# Patient Record
Sex: FEMALE | Race: WHITE | NOT HISPANIC OR LATINO | URBAN - METROPOLITAN AREA
[De-identification: names, ages, dates, MRNs, and addresses within clinical notes are randomized per-mention and may not be internally consistent; named-entity substitution may affect disease eponyms.]

---

## 2017-03-28 ENCOUNTER — OFFICE VISIT (OUTPATIENT)
Dept: FAMILY MEDICINE | Facility: CLINIC | Age: 37
End: 2017-03-28

## 2017-03-28 VITALS
DIASTOLIC BLOOD PRESSURE: 73 MMHG | HEIGHT: 65 IN | SYSTOLIC BLOOD PRESSURE: 112 MMHG | TEMPERATURE: 98.2 F | OXYGEN SATURATION: 100 % | BODY MASS INDEX: 21.17 KG/M2 | WEIGHT: 127.08 LBS | HEART RATE: 61 BPM

## 2017-03-28 DIAGNOSIS — Z00.00 PREVENTATIVE HEALTH CARE: Primary | ICD-10-CM

## 2017-03-28 DIAGNOSIS — M21.611 BUNION, RIGHT: ICD-10-CM

## 2017-03-28 DIAGNOSIS — Z13.220 SCREENING FOR HYPERLIPIDEMIA: ICD-10-CM

## 2017-03-28 LAB
BUN SERPL-MCNC: 14 MG/DL (ref 5–24)
CALCIUM SERPL-MCNC: 8.9 MG/DL (ref 8.5–10.4)
CHLORIDE SERPLBLD-SCNC: 103 MMOL/L (ref 94–109)
CHOLEST SERPL-MCNC: 236 MG/DL (ref 0–200)
CHOLEST/HDLC SERPL: 3.5 {RATIO} (ref 0–5)
CO2 SERPL-SCNC: 28 MMOL/L (ref 20–32)
CREAT SERPL-MCNC: 0.7 MG/DL (ref 0.6–1.3)
DEPRECATED CALCIDIOL+CALCIFEROL SERPL-MC: 26 UG/L (ref 20–75)
EGFR CALCULATED (BLACK REFERENCE): 120.4
EGFR CALCULATED (NON BLACK REFERENCE): 99.5
ERYTHROCYTE [DISTWIDTH] IN BLOOD BY AUTOMATED COUNT: 12.6 % (ref 10–15)
FASTING SPECIMEN: YES
GLUCOSE SERPL-MCNC: 89 MG/DL (ref 60–109)
HCT VFR BLD AUTO: 40.4 % (ref 35–47)
HDLC SERPL-MCNC: 66 MG/DL
HGB BLD-MCNC: 13.4 G/DL (ref 11.7–15.7)
LDLC SERPL CALC-MCNC: 155 MG/DL (ref 0–129)
MCH RBC QN AUTO: 30.5 PG (ref 26.5–33)
MCHC RBC AUTO-ENTMCNC: 33.2 G/DL (ref 31.5–36.5)
MCV RBC AUTO: 92 FL (ref 78–100)
PLATELET # BLD AUTO: 323 10E9/L (ref 150–450)
POTASSIUM SERPL-SCNC: 4.2 MMOL/L (ref 3.4–5.3)
RBC # BLD AUTO: 4.4 10E12/L (ref 3.8–5.2)
SODIUM SERPL-SCNC: 139 MMOL/L (ref 133–144)
TRIGL SERPL-MCNC: 70 MG/DL (ref 0–150)
VLDL-CHOLESTEROL: 14 (ref 7–32)
WBC # BLD AUTO: 7.8 10E9/L (ref 4–11)

## 2017-03-28 ASSESSMENT — PAIN SCALES - GENERAL: PAINLEVEL: NO PAIN (0)

## 2017-03-28 NOTE — MR AVS SNAPSHOT
After Visit Summary   3/28/2017    Mrs. Ophelia Vincent    MRN: [id number]           Patient Information     Date Of Birth          1980        Visit Information        Provider Department      3/28/2017 8:00 AM Mildred Becerra MD [facility]        Today's Diagnoses     Preventative health care    -  1    Screening for hyperlipidemia          Care Instructions      Preventive Health Recommendations  Female Ages 26 - 39  Yearly exam:   See your health care provider every year in order to    Review health changes.     Discuss preventive care.      Review your medicines if you your doctor has prescribed any.    Until age 30: Get a Pap test every three years (more often if you have had an abnormal result).    After age 30: Talk to your doctor about whether you should have a Pap test every 3 years or have a Pap test with HPV screening every 5 years.   You do not need a Pap test if your uterus was removed (hysterectomy) and you have not had cancer.  You should be tested each year for STDs (sexually transmitted diseases), if you're at risk.   Talk to your provider about how often to have your cholesterol checked.  If you are at risk for diabetes, you should have a diabetes test (fasting glucose).  Shots: Get a flu shot each year. Get a tetanus shot every 10 years.   Nutrition:     Eat at least 5 servings of fruits and vegetables each day.    Eat whole-grain bread, whole-wheat pasta and brown rice instead of white grains and rice.    Talk to your provider about Calcium and Vitamin D.     Lifestyle    Exercise at least 150 minutes a week (30 minutes a day, 5 days of the week). This will help you control your weight and prevent disease.    Limit alcohol to one drink per day.    No smoking.     Wear sunscreen to prevent skin cancer.    See your dentist every six months for an exam and cleaning.      CALCIUM    Recommendations:  Teenagers, men and premenopausal women: 1200  mg/day  Pregnant and Lactating women: 1500 mg/day  Postmenopausal women on estrogen: 1200mg/day  Postmenopausal women not on estrogen: 1500 mg/day    If you are not eating dairy products you also need 1000 IU of vitamin D per day which can be obtained in either a multivitamin or in some of the Calcium tablets.   look at % (based on 1000)    Dairy sources  Milk-  cow's              8 oz            300 mg  [place] milk             8 oz            450mg  Yogurt                      6 oz            300mg  Hard cheese                     1.5 oz          300 mg  Cottage cheese                  2 cup           300 mg  Low fat dairy sources are recommended    Non Dairy sources  OJ with calcium               1 cup           300mg  Total cereal                     1 cup           1000mg    Supplements:  Tums EX                         300 mg  Tums Ultra                      400 mg  Caltrate 600                    600 mg  Oscal                           500 mg  Oscal/D                         500 mg plus vitamin D  Viactive chews                  500mg each  Women's Formula Multivitamin    450 mg          Follow-ups after your visit        Who to contact     Please call your clinic at [phone] to:    Ask questions about your health    Make or cancel appointments    Discuss your medicines    Learn about your test results    Speak to your doctor   If you have compliments or concerns about an experience at your clinic, or if you wish to file a complaint, please contact [facility] Patient Relations at [phone] or email us at [email or web address]         Additional Information About Your Visit        MyChart Information     [employer] gives you secure access to your electronic health record. If you see a primary care provider, you can also send messages to your care team and make appointments. If you have questions, please call your primary care clinic.  If you do not have a primary care  "provider, please call [phone] and they will assist you.      [employer] is an electronic gateway that provides easy, online access to your medical records. With [employer], you can request a clinic appointment, read your test results, renew a prescription or communicate with your care team.     To access your existing account, please contact your [facility] or call [phone] for assistance.        Care EveryWhere ID     This is your Care EveryWhere ID. This could be used by other organizations to access your [place] medical records  [id number]        Your Vitals Were     Pulse Temperature Height Last Period Pulse Oximetry Breastfeeding?    61 98.2  F (36.8  C) 5' 5\" (165.1 cm) 03/11/2017 (Exact Date) 100% No    BMI (Body Mass Index)                   21.15 kg/m2            Blood Pressure from Last 3 Encounters:   03/28/17 112/73   03/16/16 115/78   03/19/15 112/73    Weight from Last 3 Encounters:   03/28/17 127 lb 1.3 oz (57.6 kg)   03/16/16 124 lb 12 oz (56.6 kg)   03/19/15 121 lb (54.9 kg)              We Performed the Following     Basic Metabolic Panel ([place])     CBC with platelets     Lipid Panel ([place])     Vitamin D Deficiency        Primary Care Provider Office Phone # Fax #    Mildred Becerra [phone]       [place] [phone]        Thank you!     Thank you for choosing [facility]  for your care. Our goal is always to provide you with excellent care. Hearing back from our patients is one way we can continue to improve our services. Please take a few minutes to complete the written survey that you may receive in the mail after your visit with us. Thank you!             Your Updated Medication List - Protect others around you: Learn how to safely use, store and throw away your medicines at www.disposemymeds.org.          This list is accurate as of: 3/28/17  8:51 AM.  Always use your most " recent med list.                   Brand Name Dispense Instructions for use    COMPRESSION STOCKINGS     2 Package    Knee high compression stockings  20-30mmHg such as TEDS hose       MULTIVITAMINS PO

## 2017-03-28 NOTE — PATIENT INSTRUCTIONS
Preventive Health Recommendations  Female Ages 26 - 39  Yearly exam:   See your health care provider every year in order to    Review health changes.     Discuss preventive care.      Review your medicines if you your doctor has prescribed any.    Until age 30: Get a Pap test every three years (more often if you have had an abnormal result).    After age 30: Talk to your doctor about whether you should have a Pap test every 3 years or have a Pap test with HPV screening every 5 years.   You do not need a Pap test if your uterus was removed (hysterectomy) and you have not had cancer.  You should be tested each year for STDs (sexually transmitted diseases), if you're at risk.   Talk to your provider about how often to have your cholesterol checked.  If you are at risk for diabetes, you should have a diabetes test (fasting glucose).  Shots: Get a flu shot each year. Get a tetanus shot every 10 years.   Nutrition:     Eat at least 5 servings of fruits and vegetables each day.    Eat whole-grain bread, whole-wheat pasta and brown rice instead of white grains and rice.    Talk to your provider about Calcium and Vitamin D.     Lifestyle    Exercise at least 150 minutes a week (30 minutes a day, 5 days of the week). This will help you control your weight and prevent disease.    Limit alcohol to one drink per day.    No smoking.     Wear sunscreen to prevent skin cancer.    See your dentist every six months for an exam and cleaning.      CALCIUM    Recommendations:  Teenagers, men and premenopausal women: 1200 mg/day  Pregnant and Lactating women: 1500 mg/day  Postmenopausal women on estrogen: 1200mg/day  Postmenopausal women not on estrogen: 1500 mg/day    If you are not eating dairy products you also need 1000 IU of vitamin D per day which can be obtained in either a multivitamin or in some of the Calcium tablets.   look at % (based on 1000)    Dairy sources  Milk-  cow's              8 oz            300 mg  [place] milk              8 oz            450mg  Yogurt                      6 oz            300mg  Hard cheese                     1.5 oz          300 mg  Cottage cheese                  2 cup           300 mg  Low fat dairy sources are recommended    Non Dairy sources  OJ with calcium               1 cup           300mg  Total cereal                     1 cup           1000mg    Supplements:  Tums EX                         300 mg  Tums Ultra                      400 mg  Caltrate 600                    600 mg  Oscal                           500 mg  Oscal/D                         500 mg plus vitamin D  Viactive chews                  500mg each  Women's Formula Multivitamin    450 mg

## 2017-03-28 NOTE — PROGRESS NOTES
Ophelia Vincent is here for a general check up. She is fasting. She is up to date on eye exams and dental visits. Wears seat belt-yes. Bike helmet- na.   Concerns today:    KIMBERLY Chin is here for check up. She is due for routine pap smear, but sees ob/gyn. Up to date on Tdap.  Influenza vaccine: declines.     Diet: healthy diet, lots of veggies, cooks at home, some meat  Snacking throughout the day    Migraine  She gets a monthly migraine, usually with her menses. She uses Excederin, gets aura. Pain starts behind right eye, throbbing. Associated with menses.  She declines rx for sumatriptan but we discussed use of ibuprofen prior to menses to help dampen symptoms.     Bunion  Right great toe, worse with walking. Her great toe is also starting to overlap with the second toe. We discussed referral to podiatry.    Health Maintenance   Topic Date Due     PAP SCREENING Q3 YR (SYSTEM ASSIGNED)  03/01/2017     INFLUENZA VACCINE (SYSTEM ASSIGNED)  09/01/2017     TETANUS IMMUNIZATION (SYSTEM ASSIGNED)  01/03/2024       Patient Active Problem List   Diagnosis     Migraine with aura     Unicornate uterus       Past Surgical History:   Procedure Laterality Date     NO HISTORY OF SURGERY         Family History   Problem Relation Age of Onset     Hypertension Mother      Hyperlipidemia Mother      HEART DISEASE Father 51     MI with stent placement     High cholesterol Father      OSTEOPOROSIS Maternal Grandmother      CEREBROVASCULAR DISEASE Paternal Grandmother      Hypertension Paternal Grandmother      CANCER Maternal Grandfather      bone marrow, leukemia     DIABETES Maternal Grandfather      Hypertension Maternal Grandfather      Alcoholism Maternal Grandfather      HEART DISEASE Paternal Grandfather      HEART DISEASE Paternal Uncle 50     x 2 brother     Migraines Sister        Social    1 son, 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  Hx abnormal pap? None  Sees ob/gyn  STD hx? no  cycle length: 5 days of flow  Typically 28 days  dysmenorrhea/PMS: yes, 1 wk prior to menses, migraine, Excederin  Vasomotor sx: no  Contraception: none, open to pregnancy   G 1 P 1 A 0  Self Breast exam: yes    Current Outpatient Prescriptions   Medication Sig Dispense Refill     Multiple Vitamin (MULTIVITAMINS PO)        COMPRESSION STOCKINGS Knee high compression stockings   20-30mmHg such as TEDS hose 2 Package 1     Prenatal MV-Min-Fe Fum-FA-DHA (PRENATAL 1 PO)        Allergies   Allergen Reactions     Penicillins          ROS  CONSTITUTIONAL:NEGATIVE for fever, chills, change in weight  INTEGUMENTARY/SKIN: NEGATIVE for worrisome rashes, . She has noted more moles and is asking for routine skin check.   EYES: NEGATIVE for vision changes or irritation  ENT/MOUTH: NEGATIVE for ear, mouth and throat problems  RESP:NEGATIVE for significant cough or SOB  BREAST: NEGATIVE for masses, tenderness or discharge  CV: NEGATIVE for chest pain, palpitations, RICKETTS, orthopnea, PND  or peripheral edema  GI: NEGATIVE for nausea, abdominal pain, heartburn, or change in bowel habits  :NEGATIVE for frequency, dysuria, or hematuria  MUSCULOSKELETAL:NEGATIVE for significant arthralgias or myalgia  NEURO: NEGATIVE for weakness, dizziness or paresthesias. Positive for migraines as above  ENDOCRINE: NEGATIVE for polyuria/dipsia,  temperature intolerance, skin/hair changes  HEME/ALLERGY/IMMUNE: NEGATIVE for bleeding problems  PSYCHIATRIC: NEGATIVE for changes in mood or affect, situational stress with trying to balance family/ job but no depression or anxiety. Discussed self cares. Sleep is interrupted with toddler in the house    EXAM  /73 (BP Location: Left arm, Patient Position: Chair, Cuff Size: Adult Regular)  Pulse 61  Temp 98.2  F  "(36.8  C)  Ht 5' 5\" (165.1 cm)  Wt 127 lb 1.3 oz (57.6 kg)  LMP 03/11/2017 (Exact Date)  SpO2 100%  Breastfeeding? No  BMI 21.15 kg/m2  GENERAL APPEARANCE: Alert, pleasant, NAD  EYES: PERRL, EOMI, conjunctiva clear  HENT: TM normal bilaterally. Nose and mouth without lesions  NECK: no adenopathy, thyroid normal to palpation  RESP: lungs clear to auscultation bilaterally,  BREAST: normal without masses, no tenderness or nipple discharge and no palpable  axillary masses or adenopathy   CV: regular rate and rhythm, normal S1 S2, no murmur, no carotid bruits  ABDOMEN: soft, nontender, without HSM or masses. Bowel sounds normal  : Not done  RECTAL EXAM: not done   MS: extremities normal- no gross deformities noted, no tender, hot or swollen joints.    SKIN: no suspicious lesions or rashes  NEURO: Normal strength and tone, sensory exam grossly normal, DTR normoreflexive in upper and lower extremities  PSYCH: mentation appears normal. and affect normal/bright.  EXT: no peripheral edema, pedal pulses palpable  Right foot: noted bunion at first metatarsal head. Slight overlap of great toe onto second toe.    Assessment:  (Z00.00) Preventative health care  (primary encounter diagnosis)  Comment: healthy 37 yo woman  Plan: Basic Metabolic Panel ([place]), CBC with         platelets, Vitamin D Deficiency        Anticipatory guidance given today regarding diet, exercise, calcium intake.      (Z13.220) Screening for hyperlipidemia  Comment: fasting today, family hx of early heart disease.   Plan: Lipid Panel ([place])        Discussed healthy diet, need for regular exercise program    (M21.611) Bunion, right  Comment: worsening pain, now great toe also starting to overlap second toe  Plan: PODIATRY/FOOT & ANKLE SURGERY REFERRAL        Refer to podiatrist for evaluation and treatment.     Mildred Becerra MD  Internal Medicine/Pediatrics      "

## 2017-03-28 NOTE — NURSING NOTE
"36 year old  Chief Complaint   Patient presents with     Physical       Blood pressure 112/73, pulse 61, temperature 98.2  F (36.8  C), height 5' 5\" (165.1 cm), weight 127 lb 1.3 oz (57.6 kg), SpO2 100 %. Body mass index is 21.15 kg/(m^2).  Patient Active Problem List   Diagnosis     Migraine with aura     Unicornate uterus       Wt Readings from Last 2 Encounters:   03/28/17 127 lb 1.3 oz (57.6 kg)   03/16/16 124 lb 12 oz (56.6 kg)     BP Readings from Last 3 Encounters:   03/28/17 112/73   03/16/16 115/78   03/19/15 112/73         Current Outpatient Prescriptions   Medication     Multiple Vitamin (MULTIVITAMINS PO)     COMPRESSION STOCKINGS     No current facility-administered medications for this visit.        Social History   Substance Use Topics     Smoking status: Never Smoker     Smokeless tobacco: Never Used     Alcohol use No       Health Maintenance Due   Topic Date Due     PAP SCREENING Q3 YR (SYSTEM ASSIGNED)  03/01/2017       No results found for: DENA Celestin CMA  March 28, 2017 7:56 AM  "

## 2017-12-24 ENCOUNTER — HEALTH MAINTENANCE LETTER (OUTPATIENT)
Age: 37
End: 2017-12-24

## 2018-01-04 ENCOUNTER — OFFICE VISIT (OUTPATIENT)
Dept: DERMATOLOGY | Facility: CLINIC | Age: 38
End: 2018-01-04
Payer: COMMERCIAL

## 2018-01-04 DIAGNOSIS — D23.9 DERMAL NEVUS: Primary | ICD-10-CM

## 2018-01-04 DIAGNOSIS — D22.9 MULTIPLE BENIGN NEVI: ICD-10-CM

## 2018-01-04 ASSESSMENT — PAIN SCALES - GENERAL: PAINLEVEL: NO PAIN (0)

## 2018-01-04 NOTE — MR AVS SNAPSHOT
After Visit Summary   1/4/2018    Ophelia Vincent    MRN: [id number]           Patient Information     Date Of Birth          1980        Visit Information        Provider Department      1/4/2018 8:30 AM Panchito Lau MD [facility]        Today's Diagnoses     Dermal nevus    -  1    Multiple benign nevi          Care Instructions    The ABCDEs of Melanoma    Skin cancer can develop anywhere on the skin. Ask someone for help when checking your skin, especially in hard to see places. If you notice a mole different from others, or that changes, enlarges, itches, or bleeds (even if it is small), you should see a dermatologist.                  Follow-ups after your visit        Who to contact     Please call your clinic at [phone] to:    Ask questions about your health    Make or cancel appointments    Discuss your medicines    Learn about your test results    Speak to your doctor   If you have compliments or concerns about an experience at your clinic, or if you wish to file a complaint, please contact [facility] Patient Relations at [phone] or email us at [email or web address]         Additional Information About Your Visit        MyChart Information     [employer] gives you secure access to your electronic health record. If you see a primary care provider, you can also send messages to your care team and make appointments. If you have questions, please call your primary care clinic.  If you do not have a primary care provider, please call [phone] and they will assist you.      [email or web address] is an electronic gateway that provides easy, online access to your medical records. With [email or web address], you can request a clinic appointment, read your test results, renew a prescription or communicate with your care team.     To access your existing account, please contact your [facility] or call [phone] for  assistance.        Care EveryWhere ID     This is your Care EveryWhere ID. This could be used by other organizations to access your [place] medical records  [id number]         Blood Pressure from Last 3 Encounters:   03/28/17 112/73   03/16/16 115/78   03/19/15 112/73    Weight from Last 3 Encounters:   03/28/17 57.6 kg (127 lb 1.3 oz)   03/16/16 56.6 kg (124 lb 12 oz)   03/19/15 54.9 kg (121 lb)              Today, you had the following     No orders found for display       Primary Care Provider Office Phone # Fax #    Mildred Munira Becerra [phone]       [address] [place] [id number]        Equal Access to Services     MARY NARANJO : Bryson lloydo Sonicki, waaxda luqadaha, qaybta kaalmada adeegyada, sandra hall . So [facility] [phone].    ATENCIÓN: Si habla español, tiene a alfonso disposición servicios gratuitos de asistencia lingüística. Ll[facility] [phone].    We comply with applicable federal civil rights laws and [place] laws. We do not discriminate on the basis of race, color, national origin, age, disability, sex, sexual orientation, or gender identity.            Thank you!     Thank you for choosing [facility]  for your care. Our goal is always to provide you with excellent care. Hearing back from our patients is one way we can continue to improve our services. Please take a few minutes to complete the written survey that you may receive in the mail after your visit with us. Thank you!             Your Updated Medication List - Protect others around you: Learn how to safely use, store and throw away your medicines at www.disposemymeds.org.          This list is accurate as of: 1/4/18  8:57 AM.  Always use your most recent med list.                   Brand Name Dispense Instructions for use Diagnosis    COMPRESSION STOCKINGS     2 Package    Knee high compression stockings  20-30mmHg such as TEDS hose    Varicose veins        MULTIVITAMINS PO

## 2018-01-04 NOTE — NURSING NOTE
Chief Complaint   Patient presents with     Skin Check     Elsy is here today to have a skin cancer exam. She has one mole that is newer after having a baby, under her right underarm. She states that the mole does not bother her but she would like to make sure its okay.      Luda George, CMA

## 2018-01-04 NOTE — LETTER
1/4/2018       RE: Ophelia Vincent  [address]  [facility] [address]     Dear Colleague,    Thank you for referring your patient, Ophelia Vincent, to the [facility] DERMATOLOGY at [facility]. Please see a copy of my visit note below.    [facility] Dermatology Note      Dermatology Problem List:  FBSE 1/4/18  1. Multiple benign nevi  2. SKs.     Encounter Date: Jan 4, 2018    CC:   Chief Complaint   Patient presents with     Skin Check     Elsy is here today to have a skin cancer exam. She has one mole that is newer after having a baby, under her right underarm. She states that the mole does not bother her but she would like to make sure its okay.        History of Present Illness:  Ms. Ophelia Vincent is a 37 year old female who presents in referral for a full body skin examination. She reports a growing brown raised lesion in her right axilla. Has noticed for about 9 months, but asymptomatic, no bleeding, pain, or pruritus. She reports no personal history of skin cancer but does have a family history of possible NMSC in her maternal grandfather. She otherwise denies any new or concerning lesions. No bleeding, pruritic or painful lesions. She reports no history of indoor tanning. She wears a SPF 15 sunscreen/moisturizer daily. No other skin concerns.     Past Medical History:   Patient Active Problem List   Diagnosis     Migraine with aura     Unicornate uterus     Past Medical History:   Diagnosis Date     Unicornate uterus     ovulates every other month     Past Surgical History:   Procedure Laterality Date     NO HISTORY OF SURGERY         Social History:  The patient works as a psychologist. The patient denies use of tanning beds.    Family History:  Possible (?) family history of NMSC in her maternal grandfather.     Medications:  Current Outpatient Prescriptions   Medication Sig Dispense Refill     Multiple Vitamin  (MULTIVITAMINS PO)        COMPRESSION STOCKINGS Knee high compression stockings   20-30mmHg such as TEDS hose 2 Package 1        Allergies   Allergen Reactions     Penicillins          Review of Systems:  -Skin/Heme New Pt: The patient denies frequent sun exposure. The patient denies excessive scarring or problems healing except as per HPI. The patient denies excessive bleeding.  -Constitutional: The patient denies fatigue, fevers, chills, unintended weight loss, and night sweats.  -HEENT: Patient denies nonhealing oral sores.  -Skin: As above in HPI. No additional skin concerns.    Physical exam:  Vitals: There were no vitals taken for this visit.  GEN: This is a well developed, well-nourished female in no acute distress, in a pleasant mood.    SKIN: Total skin excluding the undergarment areas was performed. The exam included the head/face, neck, both arms, chest, back, abdomen, both legs, digits and/or nails.  - Kwan Type I/II   -There is a waxy stuck on tan to brown papule with milia-like cysts in the right axillae.  -Multiple regular brown pigmented macules and papules are identified on the trunk and extremities. Normal reticular and globular pigment network under dermoscopy. ~ 20-30 total nevi.  - Mild generalized xerosis with few excoriations on the upper back.   - No other lesions of concern on areas examined.     Impression/Plan:    1. Seborrheic keratosis, trunk, extremities, axilla. Benign nature discussed. Given largely asymptomatic, no treatment needed.     2. Multiple clinically benign nevi on the trunk and extremities.    ABCDs of melanoma were discussed and self skin checks were advised.     Sun precaution was advised including the use of sun screens of SPF 30 or higher, sun protective clothing, and avoidance of tanning beds.    Follow-up prn for new or changing lesions.  Dr. Waldrop staffed the patient.    Staff Involved:  Resident(Panchito Lau)/Staff(as above)    I have personally examined  this patient and agree with Dr. Lau's documentation and plan of care. I have reviewed and amended the resident's note above. The documentation accurately reflects my clinical observations, diagnoses, treatment and follow-up plans.     Luiza Waldrop MD  Dermatology Staff

## 2018-01-04 NOTE — PROGRESS NOTES
[facility] Dermatology Note      Dermatology Problem List:  FBSE 1/4/18  1. Multiple benign nevi  2. SKs.     Encounter Date: Jan 4, 2018    CC:   Chief Complaint   Patient presents with     Skin Check     Elsy is here today to have a skin cancer exam. She has one mole that is newer after having a baby, under her right underarm. She states that the mole does not bother her but she would like to make sure its okay.        History of Present Illness:  Ms. Ophelia Vincent is a 37 year old female who presents in referral for a full body skin examination. She reports a growing brown raised lesion in her right axilla. Has noticed for about 9 months, but asymptomatic, no bleeding, pain, or pruritus. She reports no personal history of skin cancer but does have a family history of possible NMSC in her maternal grandfather. She otherwise denies any new or concerning lesions. No bleeding, pruritic or painful lesions. She reports no history of indoor tanning. She wears a SPF 15 sunscreen/moisturizer daily. No other skin concerns.     Past Medical History:   Patient Active Problem List   Diagnosis     Migraine with aura     Unicornate uterus     Past Medical History:   Diagnosis Date     Unicornate uterus     ovulates every other month     Past Surgical History:   Procedure Laterality Date     NO HISTORY OF SURGERY         Social History:  The patient works as a psychologist. The patient denies use of tanning beds.    Family History:  Possible (?) family history of NMSC in her maternal grandfather.     Medications:  Current Outpatient Prescriptions   Medication Sig Dispense Refill     Multiple Vitamin (MULTIVITAMINS PO)        COMPRESSION STOCKINGS Knee high compression stockings   20-30mmHg such as TEDS hose 2 Package 1        Allergies   Allergen Reactions     Penicillins          Review of Systems:  -Skin/Heme New Pt: The patient denies frequent sun exposure. The patient denies excessive  scarring or problems healing except as per HPI. The patient denies excessive bleeding.  -Constitutional: The patient denies fatigue, fevers, chills, unintended weight loss, and night sweats.  -HEENT: Patient denies nonhealing oral sores.  -Skin: As above in HPI. No additional skin concerns.    Physical exam:  Vitals: There were no vitals taken for this visit.  GEN: This is a well developed, well-nourished female in no acute distress, in a pleasant mood.    SKIN: Total skin excluding the undergarment areas was performed. The exam included the head/face, neck, both arms, chest, back, abdomen, both legs, digits and/or nails.  - Kwan Type I/II   -There is a waxy stuck on tan to brown papule with milia-like cysts in the right axillae.  -Multiple regular brown pigmented macules and papules are identified on the trunk and extremities. Normal reticular and globular pigment network under dermoscopy. ~ 20-30 total nevi.  - Mild generalized xerosis with few excoriations on the upper back.   - No other lesions of concern on areas examined.     Impression/Plan:    1. Seborrheic keratosis, trunk, extremities, axilla. Benign nature discussed. Given largely asymptomatic, no treatment needed.     2. Multiple clinically benign nevi on the trunk and extremities.    ABCDs of melanoma were discussed and self skin checks were advised.     Sun precaution was advised including the use of sun screens of SPF 30 or higher, sun protective clothing, and avoidance of tanning beds.    Follow-up prn for new or changing lesions.  Dr. Waldrop staffed the patient.    Staff Involved:  Resident(Panchito Lau)/Staff(as above)    I have personally examined this patient and agree with Dr. Lau's documentation and plan of care. I have reviewed and amended the resident's note above. The documentation accurately reflects my clinical observations, diagnoses, treatment and follow-up plans.     Luiza Waldrop MD  Dermatology Staff

## 2018-05-14 NOTE — PATIENT INSTRUCTIONS
theracane    Preventive Health Recommendations  Female Ages 26 - 39  Yearly exam:   See your health care provider every year in order to    Review health changes.     Discuss preventive care.      Review your medicines if you your doctor has prescribed any.    Until age 30: Get a Pap test every three years (more often if you have had an abnormal result).    After age 30: Talk to your doctor about whether you should have a Pap test every 3 years or have a Pap test with HPV screening every 5 years.   You do not need a Pap test if your uterus was removed (hysterectomy) and you have not had cancer.  You should be tested each year for STDs (sexually transmitted diseases), if you're at risk.   Talk to your provider about how often to have your cholesterol checked.  If you are at risk for diabetes, you should have a diabetes test (fasting glucose).  Shots: Get a flu shot each year. Get a tetanus shot every 10 years.   Nutrition:     Eat at least 5 servings of fruits and vegetables each day.    Eat whole-grain bread, whole-wheat pasta and brown rice instead of white grains and rice.    Talk to your provider about Calcium and Vitamin D.     Lifestyle    Exercise at least 150 minutes a week (30 minutes a day, 5 days of the week). This will help you control your weight and prevent disease.    Limit alcohol to one drink per day.    No smoking.     Wear sunscreen to prevent skin cancer.    See your dentist every six months for an exam and cleaning.

## 2018-05-15 ENCOUNTER — OFFICE VISIT (OUTPATIENT)
Dept: FAMILY MEDICINE | Facility: CLINIC | Age: 38
End: 2018-05-15
Payer: COMMERCIAL

## 2018-05-15 VITALS
DIASTOLIC BLOOD PRESSURE: 70 MMHG | BODY MASS INDEX: 21.49 KG/M2 | HEART RATE: 61 BPM | TEMPERATURE: 97.7 F | SYSTOLIC BLOOD PRESSURE: 104 MMHG | OXYGEN SATURATION: 100 % | WEIGHT: 129 LBS | HEIGHT: 65 IN

## 2018-05-15 DIAGNOSIS — I83.90 VARICOSE VEIN OF LEG: ICD-10-CM

## 2018-05-15 DIAGNOSIS — N92.0 MENORRHAGIA WITH REGULAR CYCLE: ICD-10-CM

## 2018-05-15 DIAGNOSIS — Z00.00 ROUTINE GENERAL MEDICAL EXAMINATION AT A HEALTH CARE FACILITY: Primary | ICD-10-CM

## 2018-05-15 DIAGNOSIS — Z82.49 FAMILY HISTORY OF ISCHEMIC HEART DISEASE: ICD-10-CM

## 2018-05-15 LAB
CHOLEST SERPL-MCNC: 213 MG/DL (ref 0–200)
CHOLEST/HDLC SERPL: 3.6 {RATIO} (ref 0–5)
ERYTHROCYTE [DISTWIDTH] IN BLOOD BY AUTOMATED COUNT: 12.5 % (ref 10–15)
FASTING SPECIMEN: YES
FERRITIN SERPL-MCNC: 19 NG/ML (ref 12–150)
HCT VFR BLD AUTO: 39.1 % (ref 35–47)
HDLC SERPL-MCNC: 59 MG/DL
HGB BLD-MCNC: 13.2 G/DL (ref 11.7–15.7)
IRON SATN MFR SERPL: 50 % (ref 15–46)
IRON SERPL-MCNC: 121 UG/DL (ref 35–180)
LDLC SERPL CALC-MCNC: 140 MG/DL (ref 0–129)
MCH RBC QN AUTO: 30.8 PG (ref 26.5–33)
MCHC RBC AUTO-ENTMCNC: 33.8 G/DL (ref 31.5–36.5)
MCV RBC AUTO: 91 FL (ref 78–100)
PLATELET # BLD AUTO: 272 10E9/L (ref 150–450)
RBC # BLD AUTO: 4.29 10E12/L (ref 3.8–5.2)
TIBC SERPL-MCNC: 240 UG/DL (ref 240–430)
TRIGL SERPL-MCNC: 69 MG/DL (ref 0–150)
TSH SERPL DL<=0.005 MIU/L-ACNC: 0.92 MU/L (ref 0.4–4)
VLDL-CHOLESTEROL: 14 (ref 7–32)
WBC # BLD AUTO: 4.9 10E9/L (ref 4–11)

## 2018-05-15 ASSESSMENT — PAIN SCALES - GENERAL: PAINLEVEL: NO PAIN (0)

## 2018-05-15 NOTE — MR AVS SNAPSHOT
After Visit Summary   5/15/2018    Ophelia Vincent    MRN: [id number]           Patient Information     Date Of Birth          1980        Visit Information        Provider Department      5/15/2018 8:00 AM Mildred Becerra MD [facility]        Today's Diagnoses     Routine general medical examination at a health care facility    -  1    Family history of ischemic heart disease        Menorrhagia with regular cycle        Varicose vein of leg          Care Instructions    theracane    Preventive Health Recommendations  Female Ages 26 - 39  Yearly exam:   See your health care provider every year in order to    Review health changes.     Discuss preventive care.      Review your medicines if you your doctor has prescribed any.    Until age 30: Get a Pap test every three years (more often if you have had an abnormal result).    After age 30: Talk to your doctor about whether you should have a Pap test every 3 years or have a Pap test with HPV screening every 5 years.   You do not need a Pap test if your uterus was removed (hysterectomy) and you have not had cancer.  You should be tested each year for STDs (sexually transmitted diseases), if you're at risk.   Talk to your provider about how often to have your cholesterol checked.  If you are at risk for diabetes, you should have a diabetes test (fasting glucose).  Shots: Get a flu shot each year. Get a tetanus shot every 10 years.   Nutrition:     Eat at least 5 servings of fruits and vegetables each day.    Eat whole-grain bread, whole-wheat pasta and brown rice instead of white grains and rice.    Talk to your provider about Calcium and Vitamin D.     Lifestyle    Exercise at least 150 minutes a week (30 minutes a day, 5 days of the week). This will help you control your weight and prevent disease.    Limit alcohol to one drink per day.    No smoking.     Wear sunscreen to prevent skin cancer.    See your dentist every six  months for an exam and cleaning.            Follow-ups after your visit        Additional Services     Cardiology Adult Referral - [place]       Your provider has referred you to:     Adult Cardiology  [phone]  Option #1 - [facility]  Option #2 - [place]    Please be aware that coverage of these services is subject to the terms and limitations of your health insurance plan.  Call member services at your health plan with any benefit or coverage questions.      Type of Referral:  Heart Prevention Screening ([facility])    Timeframe requested:  Within 1 month    Please bring the following to your appointment:  >>   Any x-rays, CTs or MRIs which have been performed.  Contact the facility where they were done to arrange for  prior to your scheduled appointment.  Any new CT, MRI or other procedures ordered by your specialist must be performed at a [facility] or coordinated by your clinic's referral office.   >>   List of current medications  >>   This referral request   >>   Any documents/labs given to you for this referral                  Who to contact     Please call your clinic at [phone] to:    Ask questions about your health    Make or cancel appointments    Discuss your medicines    Learn about your test results    Speak to your doctor            Additional Information About Your Visit        [employer] Information     [employer] gives you secure access to your electronic health record. If you see a primary care provider, you can also send messages to your care team and make appointments. If you have questions, please call your primary care clinic.  If you do not have a primary care provider, please call [phone] and they will assist you.      [employer] is an electronic gateway that provides easy, online access to your medical records. With [employer], you can request a clinic appointment, read your test results, renew a prescription or communicate with  "your care team.     To access your existing account, please contact your [facility] or call [phone] for assistance.        Care EveryWhere ID     This is your Care EveryWhere ID. This could be used by other organizations to access your [facility] medical records  [id number]        Your Vitals Were     Pulse Temperature Height Last Period Pulse Oximetry BMI (Body Mass Index)    61 97.7  F (36.5  C) (Oral) 5' 4.75\" (164.5 cm) 05/05/2018 100% 21.63 kg/m2       Blood Pressure from Last 3 Encounters:   05/15/18 104/70   03/28/17 112/73   03/16/16 115/78    Weight from Last 3 Encounters:   05/15/18 129 lb (58.5 kg)   03/28/17 127 lb 1.3 oz (57.6 kg)   03/16/16 124 lb 12 oz (56.6 kg)              We Performed the Following     Cardiology Adult Referral - [place]     CBC with platelets     Ferritin     Iron and iron binding capacity     Lipid Panel ([place])     TSH with free T4 reflex          Where to get your medicines      Some of these will need a paper prescription and others can be bought over the counter.  Ask your nurse if you have questions.     Bring a paper prescription for each of these medications     COMPRESSION STOCKINGS          Primary Care Provider Office Phone # Fax #    Mildred Becerra [phone]       [address] [place] [address]        Equal Access to Services     [facility] HospitalFRANC[facility] : Hadii alyson Gallegos, waaxda lujose, qaybta kaalmasandra hudson . So [facility] [phone].    ATENCIÓN: Si habla español, tiene a alfonso disposición servicios gratuitos de asistencia lingüística. Llame al [phone].    We comply with applicable federal civil rights laws and [place] laws. We do not discriminate on the basis of race, color, national origin, age, disability, sex, sexual orientation, or gender identity.            Thank you!     Thank you for choosing [facility]  " for your care. Our goal is always to provide you with excellent care. Hearing back from our patients is one way we can continue to improve our services. Please take a few minutes to complete the written survey that you may receive in the mail after your visit with us. Thank you!             Your Updated Medication List - Protect others around you: Learn how to safely use, store and throw away your medicines at www.disposemymeds.org.          This list is accurate as of 5/15/18  8:36 AM.  Always use your most recent med list.                   Brand Name Dispense Instructions for use Diagnosis    COMPRESSION STOCKINGS     2 each    Knee high compression stockings  20-30mmHg such as TEDS hose    Varicose vein of leg       MULTIVITAMINS PO

## 2018-05-15 NOTE — PROGRESS NOTES
Ophelia Vincent is here for a general check up. She is fasting. She is up to date on eye exams and dental visits. Wears seat belt-yes. Bike helmet- yes.   Concerns today:    KIMBERLY Chin is a 36 yo woman in good health. She sees ob/gyn for paps, done last year and was normal, 5 yr. She is up to date on vaccines. Healthy diet.     Hair thinning  She noted hair thinning after her pregnancy 4 yrs ago and she took iron with resolution but it happened again last summer.  Started ferrous sulfate last summer, drops, took x 3 months, stopped, then hair thinning returned. She eats meat. She reports heavier menses, with passage of clots. No spotting between menses. She follows with an ob/gyn and I have suggested she follow up with them for evaluation of heavier menses. She is open to pregnancy and takes a prenatal vitamin.  Would not be interested in OCP.     Migraines  With menses, will try acupuncture in the next week. Has some muscle tension in neck and upper back.  Takes Excedrin for migraines.     Health Maintenance   Topic Date Due     HIV SCREEN (SYSTEM ASSIGNED)  10/11/1996     PAP SCREENING Q3 YR (SYSTEM ASSIGNED)  03/01/2017     INFLUENZA VACCINE (Season Ended) 09/01/2018     TETANUS IMMUNIZATION (SYSTEM ASSIGNED)  01/03/2024         Patient Active Problem List   Diagnosis     Migraine with aura     Unicornate uterus       Past Surgical History:   Procedure Laterality Date     NO HISTORY OF SURGERY         Family History   Problem Relation Age of Onset     Hypertension Mother      Hyperlipidemia Mother      HEART DISEASE Father 51     MI with stent placement     High cholesterol Father      OSTEOPOROSIS Maternal Grandmother      CEREBROVASCULAR DISEASE Paternal Grandmother      Hypertension Paternal Grandmother      CANCER Maternal Grandfather      bone marrow, leukemia     DIABETES Maternal Grandfather      Hypertension Maternal Grandfather      Alcoholism Maternal Grandfather      HEART DISEASE Paternal  Grandfather      HEART DISEASE Paternal Uncle 50     x 2 brother     Migraines Sister        Social    1 son, 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  Regular cycle 28 days  Hx abnormal pap? None, had pap in 2017, normal Sees ob/gyn  STD hx? no  cycle length: 5 days of flow, changing protection often, some passage of clots  No spotting between periods  Typically 28 days  dysmenorrhea/PMS: yes, 1 wk prior to menses, migraine, Excederin  Vasomotor sx: no  Contraception: none, open to pregnancy   G 1 P 1 A 0  Self Breast exam: yes    Current Outpatient Prescriptions   Medication Sig Dispense Refill     COMPRESSION STOCKINGS Knee high compression stockings   20-30mmHg such as TEDS hose 2 Package 1     Multiple Vitamin (MULTIVITAMINS PO)        Allergies   Allergen Reactions     Penicillins          ROS  CONSTITUTIONAL:NEGATIVE for fever, chills, change in weight  INTEGUMENTARY/SKIN: NEGATIVE for worrisome rashes, moles or lesions, Hair thinning  EYES: NEGATIVE for vision changes or irritation  ENT/MOUTH: NEGATIVE for ear, mouth and throat problems  RESP:NEGATIVE for significant cough or SOB  BREAST: NEGATIVE for masses, tenderness or discharge  CV: NEGATIVE for chest pain, palpitations, RICKETTS, orthopnea, PND  or peripheral edema  GI: NEGATIVE for nausea, abdominal pain, heartburn, or change in bowel habits  :NEGATIVE for frequency, dysuria, or hematuria  MUSCULOSKELETAL:NEGATIVE for significant arthralgias or myalgia  NEURO: NEGATIVE for weakness, dizziness or paresthesias  ENDOCRINE: NEGATIVE for polyuria/dipsia,  temperature intolerance, skin/hair changes  HEME/ALLERGY/IMMUNE: NEGATIVE for bleeding problems heavier menses  PSYCHIATRIC: NEGATIVE for changes in mood or affect    EXAM  /70 (BP Location: Left arm, Patient  "Position: Chair, Cuff Size: Adult Regular)  Pulse 61  Temp 97.7  F (36.5  C) (Oral)  Ht 5' 4.75\" (164.5 cm)  Wt 129 lb (58.5 kg)  LMP 05/05/2018  SpO2 100%  BMI 21.63 kg/m2  GENERAL APPEARANCE: Alert, pleasant, NAD  EYES: PERRL, EOMI, conjunctiva clear  HENT: TM normal bilaterally. Nose and mouth without lesions  NECK: no adenopathy, thyroid normal to palpation   RESP: lungs clear to auscultation bilaterally,   BREAST: not examined  CV: regular rate and rhythm, normal S1 S2, no murmur, no carotid bruits  ABDOMEN: soft, nontender, without HSM or masses. Bowel sounds normal  : Not done  RECTAL EXAM: not done   MS: extremities normal- no gross deformities noted, no tender, hot or swollen joints.    SKIN: no suspicious lesions or rashes  NEURO: Normal strength and tone, sensory exam grossly normal, DTR normoreflexive in upper and lower extremities  PSYCH: mentation appears normal. and affect normal/bright.  EXT: no peripheral edema, pedal pulses palpable    Assessment:  (Z00.00) Routine general medical examination at a health care facility  (primary encounter diagnosis)  Comment: healthy 36 yo woman  Plan: Lipid Panel ([place])        Anticipatory guidance given today regarding diet, exercise, calcium intake.      (Z82.49) Family history of ischemic heart disease  Comment: father and paternal uncles with early ASCVD  Plan: Cardiology Adult Referral -         [place]        Refer to preventive cardiology    (N92.0) Menorrhagia with regular cycle  Comment: heavy menses, passage of clots with some associated hair thinning  Plan: CBC with platelets, Ferritin, Iron and iron         binding capacity, TSH with free T4 reflex        Recommend she follow up with her ob/gyn for evaluation of heavy menses, may need US    (I83.90) Varicose vein of leg  Comment: bilateral  Plan: COMPRESSION STOCKINGS        rx given for compression stocking.    Mildred Becerra MD  Internal Medicine/Pediatrics          "

## 2018-05-15 NOTE — NURSING NOTE
"37 year old  Chief Complaint   Patient presents with     Physical     pt is fasting.       Blood pressure 104/70, pulse 61, temperature 97.7  F (36.5  C), temperature source Oral, height 5' 4.75\" (164.5 cm), weight 129 lb (58.5 kg), last menstrual period 05/05/2018, SpO2 100 %, not currently breastfeeding. Body mass index is 21.63 kg/(m^2).  Patient Active Problem List   Diagnosis     Migraine with aura     Unicornate uterus       Wt Readings from Last 2 Encounters:   05/15/18 129 lb (58.5 kg)   03/28/17 127 lb 1.3 oz (57.6 kg)     BP Readings from Last 3 Encounters:   05/15/18 104/70   03/28/17 112/73   03/16/16 115/78         Current Outpatient Prescriptions   Medication     COMPRESSION STOCKINGS     Multiple Vitamin (MULTIVITAMINS PO)     No current facility-administered medications for this visit.        Social History   Substance Use Topics     Smoking status: Never Smoker     Smokeless tobacco: Never Used     Alcohol use No       Health Maintenance Due   Topic Date Due     HIV SCREEN (SYSTEM ASSIGNED)  10/11/1996     PAP SCREENING Q3 YR (SYSTEM ASSIGNED)  03/01/2017       No results found for: PAP      Lia Celestin CMA  May 15, 2018 7:52 AM  "

## 2018-08-27 ENCOUNTER — OFFICE VISIT (OUTPATIENT)
Dept: NEUROLOGY | Facility: CLINIC | Age: 38
End: 2018-08-27
Payer: COMMERCIAL

## 2018-08-27 VITALS
TEMPERATURE: 98.9 F | WEIGHT: 131.8 LBS | HEART RATE: 82 BPM | DIASTOLIC BLOOD PRESSURE: 71 MMHG | BODY MASS INDEX: 22.1 KG/M2 | SYSTOLIC BLOOD PRESSURE: 122 MMHG | RESPIRATION RATE: 16 BRPM

## 2018-08-27 DIAGNOSIS — G43.009 MIGRAINE WITHOUT AURA AND WITHOUT STATUS MIGRAINOSUS, NOT INTRACTABLE: Primary | ICD-10-CM

## 2018-08-27 ASSESSMENT — PAIN SCALES - GENERAL: PAINLEVEL: NO PAIN (0)

## 2018-08-27 NOTE — MR AVS SNAPSHOT
After Visit Summary   8/27/2018    Ophelia Vincent    MRN: [id number]           Patient Information     Date Of Birth          1980        Visit Information        Provider Department      8/27/2018 3:15 PM Jony Covington MD [facility] NEUROSPECIALTIES        Today's Diagnoses     Migraine without aura and without status migrainosus, not intractable    -  1       Follow-ups after your visit        Follow-up notes from your care team     Return if symptoms worsen or fail to improve.      Your next 10 appointments already scheduled     Sep 28, 2018  8:45 AM CDT   LAB with  LAB   [facility] ([facility])    [address]  [address] [facility] [place] [facility] [place] [id number] [phone]           Please do not eat 10-12 hours before your appointment if you are coming in fasting for labs on lipids, cholesterol, or glucose (sugar). This does not apply to pregnant women. Water, hot tea and black coffee (with nothing added) are okay. Do not drink other fluids, diet soda or chew gum.            Sep 28, 2018  9:00 AM CDT   (Arrive by 8:45 AM)   [facility] PREVENTATIVE VISIT with PETER Mcmillan CNP   [facility] ([facility])    [address]  5th Floor  [place] [id number] [phone]           Please arrive 15 minutes prior to your appointment to obtain your laboratory samples. The abdominal ultrasound and laboratory tests require that you fast (no food, water only) for 12 hours and no alcohol for 24 hours prior to having your blood drawn.  You may take your medications with water. You are welcome to bring a snack to have after your laboratory sample has been obtained.  Please wear comfortable clothing and shoes for walking. We will check your blood pressure during a brief activity test using a treadmill. This is not a stress test.   You may find it more convenient not to wear panty  hose since we will need to apply EKG stickers to the lower legs.  A urine sample will be needed when you arrive at the clinic.  Your appointment will include a photo of the back of your eye. Although we do not need to dilate your eyes, you may need to remove contact lenses for this test. Please bring any equipment you need to remove your contacts or wear glasses.  Except for having blood drawn, all of the diagnostic tests are conducted on the surface of the body and involve little or no discomfort or risk to your health.  We look forward to seeing you at your upcoming appointment. If you have additional questions please contact Samaria Rolon at [phone]              Who to contact     Please call your clinic at [phone] to:    Ask questions about your health    Make or cancel appointments    Discuss your medicines    Learn about your test results    Speak to your doctor            Additional Information About Your Visit        [employer] Information     5[employer] gives you secure access to your electronic health record. If you see a primary care provider, you can also send messages to your care team and make appointments. If you have questions, please call your primary care clinic.  If you do not have a primary care provider, please call [phone] and they will assist you.      5o9 is an electronic gateway that provides easy, online access to your medical records. With 5o[employer], you can request a clinic appointment, read your test results, renew a prescription or communicate with your care team.     To access your existing account, please contact your [facility] or call [phone] for assistance.        Care EveryWhere ID     This is your Care EveryWhere ID. This could be used by other organizations to access your [place] medical records  [id number]        Your Vitals Were     Pulse Temperature Respirations BMI (Body Mass Index)          82 98.9  F (37.2  C) 16 22.1  kg/m2         Blood Pressure from Last 3 Encounters:   08/27/18 122/71   05/15/18 104/70   03/28/17 112/73    Weight from Last 3 Encounters:   08/27/18 59.8 kg (131 lb 12.8 oz)   05/15/18 58.5 kg (129 lb)   03/28/17 57.6 kg (127 lb 1.3 oz)              Today, you had the following     No orders found for display       Primary Care Provider Office Phone # Fax #    Mildred Munira Becerra [phone]        [address] [place] [id number]        Equal Access to Services     [facility]: Hadii alyson Gallegos, watammyda rosalia, josiah gamblealmajennifer lindsey, sandra hall . So [facility] [phone].    ATENCIÓN: Si habla español, tiene a alfonso disposición servicios gratuitos de asistencia lingüística. Llame al [phone].    We comply with applicable federal civil rights laws and [place] laws. We do not discriminate on the basis of race, color, national origin, age, disability, sex, sexual orientation, or gender identity.            Thank you!     Thank you for choosing [facility] NEUROSPECIALTIES  for your care. Our goal is always to provide you with excellent care. Hearing back from our patients is one way we can continue to improve our services. Please take a few minutes to complete the written survey that you may receive in the mail after your visit with us. Thank you!             Your Updated Medication List - Protect others around you: Learn how to safely use, store and throw away your medicines at www.disposemymeds.org.          This list is accurate as of 8/27/18 11:59 PM.  Always use your most recent med list.                   Brand Name Dispense Instructions for use Diagnosis    COMPRESSION STOCKINGS     2 each    Knee high compression stockings  20-30mmHg such as TEDS hose    Varicose vein of leg       MULTIVITAMINS PO

## 2018-08-27 NOTE — PROGRESS NOTES
Service Date: 08/27/2018      REASON FOR VISIT:  This patient is a 38-year-old right-handed woman seen for evaluation of migraine headache.        HISTORY OF PRESENT ILLNESS:   She reports that she has had migraine headaches since her teenage years.  Typically the headaches occur in the premenstrual state.  She gets the headache a couple of days before her cycle begins.  It begins behind the right eye.  It can be throbbing with photophobia and phonophobia.  She does not miss work with the headache.  She is usually able to endure through the work day, but if she gets home then she will take Excedrin and go to bed.  If she sleeps through the night, she typically awakens the next morning and the headache is gone.  Sometimes she may have to take an extra dose of Excedrin.  This has been her lifelong pattern.  However, a few months ago in May she went to an acupuncturist to treat the headache.  After each treatment, the headache would occur the next day.  This was predictable.  The therapist was unsure why this was happening.  She went through treatment for a couple of months, but then she had to stop because of the headache.  It was a slightly different kind of headache.  She could tell when it was coming on.  There would be a sense of nausea with it.  Then it would develop into her typical migraine.  She stopped the treatments in late July.  In August, she went on vacation to [place].  She was gone for a couple weeks.  There was a lot of traveling and different types of foods.  She did have a couple of headaches while in [place] and one of them did occur before her menses.  However, the other types of headaches that seemed to occur weekly associated with the acupuncture did not occur.  She did have one occasion in [place] when she awoke at night with nausea and had to take an extra Excedrin.  She was having insomnia.  Since she has gotten back from [place] she has been fine.  She did deliver a child a few years ago.  Her  headaches pretty much were gone during her pregnancy.  Otherwise, she has no symptoms with regard to vision, hearing, speech or swallowing.  She does get occasional dizziness.  She has no focal symptoms in her arms or legs.  She did discontinue caffeine while getting the acupuncture treatments and did have some headaches possibly related to that.  She typically drinks 1-2 cups of coffee per day.  She has never found caffeine to be a helpful treatment for the headache.      PAST MEDICAL HISTORY:  Largely noncontributory.  She does not have high blood pressure, diabetes, thyroid or asthma.  She has not had pertinent surgery or trauma to the head or neck.  She is not on medication.  She does not drink or smoke.      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      FAMILY HISTORY:  Positive for heart disease in her father and her sister has migraine.      PHYSICAL EXAMINATION:  The patient is cooperative and in no distress.   VITAL SIGNS:  Her blood pressure is 122/71.     There are no carotid bruits.  Auscultation of the heart shows S1, S2.   NEUROLOGIC:  The patient is alert, oriented and lucid.  Cranial nerve testing shows full visual fields to confrontation.  Funduscopic exam shows sharp discs bilaterally.  Visual acuity is 20/20 bilaterally.  Eye movements are complete and conjugate without nystagmus.  Pupils react to light.  Facial sensation is normal.  Facies move symmetrically.  Palate elevates in the midline.  Tongue protrudes in the midline.  Motor evaluation shows no pronator drift, normal finger tapping, finger-nose-finger and heel-knee-shin.  The patient has good strength in the arms and legs.  Muscle stretch reflexes are low amplitude and symmetric in the arms and ankles and brisk at the knees.  Toes are downgoing.  Sensory exam shows preserved vibration and temperature in the hands and feet.  Romberg sign is absent.  She can walk on her heels, toes and tandem.       ASSESSMENT:   1.  Common migraine headache, related to menses.      DISCUSSION:  The patient is seen for evaluation of headache.  Her exam on this point is normal.  Her headaches did change somewhat when she was getting acupuncture treatments.  This is an unusual response to acupuncture, but it sounds like the headaches were predictable in that she would get them the day after her acupuncture treatment and it was a similar type of headache to her migraine.  When she stopped those treatments, those headaches also stopped.  It does not sound like there was a different type of headache that has been present in the last 6 months.  I discussed with her imaging for a new type of headache in the last 6 months and she does not think that the headaches were all that much different.  She thinks it was basically the same headache but provoked by acupuncture.  I am not going to proceed with any imaging.  I did discuss alternative treatments including naproxen and also triptans.  She should not be taking any of these types of medicines if she is pregnant and she is well aware of that.  She will be considering the matter about how to proceed in terms of treatment.  I will see her in followup on an as needed basis.        MD JULIAN Can MD             D: 2018   T: 2018   MT: AKA      Name:     EREN ASHTON   MRN:      [id number]        Account:      [id number]   :      1980           Service Date: 2018      Document: [id number]

## 2018-08-27 NOTE — LETTER
8/27/2018       RE: Ophelia Vincent  [address]  [place] [facility] [address]     Dear Colleague,    Thank you for referring your patient, Ophelia Vincent, to the [facility] NEUROSPECIALTIES at [facility]. Please see a copy of my visit note below.    Service Date: 08/27/2018      REASON FOR VISIT:  This patient is a 38-year-old right-handed woman seen for evaluation of migraine headache.        HISTORY OF PRESENT ILLNESS:   She reports that she has had migraine headaches since her teenage years.  Typically the headaches occur in the premenstrual state.  She gets the headache a couple of days before her cycle begins.  It begins behind the right eye.  It can be throbbing with photophobia and phonophobia.  She does not miss work with the headache.  She is usually able to endure through the work day, but if she gets home then she will take Excedrin and go to bed.  If she sleeps through the night, she typically awakens the next morning and the headache is gone.  Sometimes she may have to take an extra dose of Excedrin.  This has been her lifelong pattern.  However, a few months ago in May she went to an acupuncturist to treat the headache.  After each treatment, the headache would occur the next day.  This was predictable.  The therapist was unsure why this was happening.  She went through treatment for a couple of months, but then she had to stop because of the headache.  It was a slightly different kind of headache.  She could tell when it was coming on.  There would be a sense of nausea with it.  Then it would develop into her typical migraine.  She stopped the treatments in late July.  In August, she went on vacation to [place].  She was gone for a couple weeks.  There was a lot of traveling and different types of foods.  She did have a couple of headaches while in [place] and one of them did occur before her menses.  However, the other types of headaches that seemed  to occur weekly associated with the acupuncture did not occur.  She did have one occasion in [place] when she awoke at night with nausea and had to take an extra Excedrin.  She was having insomnia.  Since she has gotten back from [place] she has been fine.  She did deliver a child a few years ago.  Her headaches pretty much were gone during her pregnancy.  Otherwise, she has no symptoms with regard to vision, hearing, speech or swallowing.  She does get occasional dizziness.  She has no focal symptoms in her arms or legs.  She did discontinue caffeine while getting the acupuncture treatments and did have some headaches possibly related to that.  She typically drinks 1-2 cups of coffee per day.  She has never found caffeine to be a helpful treatment for the headache.      PAST MEDICAL HISTORY:  Largely noncontributory.  She does not have high blood pressure, diabetes, thyroid or asthma.  She has not had pertinent surgery or trauma to the head or neck.  She is not on medication.  She does not drink or smoke.      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      FAMILY HISTORY:  Positive for heart disease in her father and her sister has migraine.      PHYSICAL EXAMINATION:  The patient is cooperative and in no distress.   VITAL SIGNS:  Her blood pressure is 122/71.     There are no carotid bruits.  Auscultation of the heart shows S1, S2.   NEUROLOGIC:  The patient is alert, oriented and lucid.  Cranial nerve testing shows full visual fields to confrontation.  Funduscopic exam shows sharp discs bilaterally.  Visual acuity is 20/20 bilaterally.  Eye movements are complete and conjugate without nystagmus.  Pupils react to light.  Facial sensation is normal.  Facies move symmetrically.  Palate elevates in the midline.  Tongue protrudes in the midline.  Motor evaluation shows no pronator drift, normal finger tapping, finger-nose-finger and heel-knee-shin.  The patient has good strength in the  arms and legs.  Muscle stretch reflexes are low amplitude and symmetric in the arms and ankles and brisk at the knees.  Toes are downgoing.  Sensory exam shows preserved vibration and temperature in the hands and feet.  Romberg sign is absent.  She can walk on her heels, toes and tandem.      ASSESSMENT:   1.  Common migraine headache, related to menses.      DISCUSSION:  The patient is seen for evaluation of headache.  Her exam on this point is normal.  Her headaches did change somewhat when she was getting acupuncture treatments.  This is an unusual response to acupuncture, but it sounds like the headaches were predictable in that she would get them the day after her acupuncture treatment and it was a similar type of headache to her migraine.  When she stopped those treatments, those headaches also stopped.  It does not sound like there was a different type of headache that has been present in the last 6 months.  I discussed with her imaging for a new type of headache in the last 6 months and she does not think that the headaches were all that much different.  She thinks it was basically the same headache but provoked by acupuncture.  I am not going to proceed with any imaging.  I did discuss alternative treatments including naproxen and also triptans.  She should not be taking any of these types of medicines if she is pregnant and she is well aware of that.  She will be considering the matter about how to proceed in terms of treatment.  I will see her in followup on an as needed basis.           D: 2018   T: 2018   MT: AKA      Name:     EREN ASHTON   MRN:      [id number]        Account:      [id number]   :      1980           Service Date: 2018      Document: [id number]       Again, thank you for allowing me to participate in the care of your patient.      Sincerely,    Jony Covington MD

## 2018-09-04 DIAGNOSIS — E78.5 HYPERLIPIDEMIA LDL GOAL <100: Primary | ICD-10-CM

## 2018-09-25 ASSESSMENT — ENCOUNTER SYMPTOMS
NUMBNESS: 0
HOARSE VOICE: 1
NAIL CHANGES: 0
HEADACHES: 1
DISTURBANCES IN COORDINATION: 0
SORE THROAT: 0
SMELL DISTURBANCE: 0
DIZZINESS: 0
SINUS PAIN: 0
TREMORS: 0
SKIN CHANGES: 1
SEIZURES: 0
LOSS OF CONSCIOUSNESS: 0
POOR WOUND HEALING: 0
TASTE DISTURBANCE: 0
TROUBLE SWALLOWING: 0
MEMORY LOSS: 0
SINUS CONGESTION: 0
PARALYSIS: 0
TINGLING: 0
NECK MASS: 0
SPEECH CHANGE: 0
WEAKNESS: 0

## 2018-09-28 ENCOUNTER — OFFICE VISIT (OUTPATIENT)
Dept: CARDIOLOGY | Facility: CLINIC | Age: 38
End: 2018-09-28
Payer: COMMERCIAL

## 2018-09-28 VITALS
HEART RATE: 74 BPM | DIASTOLIC BLOOD PRESSURE: 73 MMHG | BODY MASS INDEX: 21.83 KG/M2 | OXYGEN SATURATION: 98 % | WEIGHT: 131 LBS | SYSTOLIC BLOOD PRESSURE: 116 MMHG | HEIGHT: 65 IN

## 2018-09-28 DIAGNOSIS — E78.5 HYPERLIPIDEMIA LDL GOAL <100: ICD-10-CM

## 2018-09-28 DIAGNOSIS — E78.5 HYPERLIPIDEMIA LDL GOAL <100: Primary | ICD-10-CM

## 2018-09-28 LAB
CHOLEST SERPL-MCNC: 193 MG/DL
CREAT UR-MCNC: 60 MG/DL
CRP SERPL HS-MCNC: 0.9 MG/L
FEF 25/75: NORMAL
FEV-1: NORMAL
FEV1/FVC: NORMAL
FVC: NORMAL
GLUCOSE SERPL-MCNC: 88 MG/DL (ref 70–99)
HDLC SERPL-MCNC: 53 MG/DL
LDLC SERPL CALC-MCNC: 129 MG/DL
MICROALBUMIN UR-MCNC: <5 MG/L
MICROALBUMIN/CREAT UR: NORMAL MG/G CR (ref 0–25)
NONHDLC SERPL-MCNC: 140 MG/DL
NT-PROBNP SERPL-MCNC: 37 PG/ML (ref 0–125)
TRIGL SERPL-MCNC: 56 MG/DL

## 2018-09-28 NOTE — PROGRESS NOTES
[facility] for Cardiovascular Disease Prevention - Exam Note    Active Problems   Patient Active Problem List    Diagnosis Date Noted     Migraine without aura and without status migrainosus, not intractable 08/27/2018     Priority: Medium     Migraine with aura 03/17/2016     Priority: Medium     Unicornate uterus 03/17/2016     Priority: Medium       Reason For Visit   Patient here for [facility] early detection of atherosclerosis and CVD exam.    Pain Evaluation  Current history of pain associated with this visit is: denied    HPI   Ophelia Vincent is a 37 year old year old female with a history of hyperlipidemia with LDL in the 150's, and family history of early coronary disease primary paternal with father and PGF and paternal uncles. Her father had an MI  Age 51 and at age 58 had 4 vessel CABG despite statin therapy.  She is concerned about her CV risk since she also has elevated lipids despite healthy living habits.  She has no chest pain but does have palpitations described as irregularity at bed time for a few seconds several times a week. She also has been having symptoms off and on of hair loss.  She is currently attempting pregnancy so she is only taking prenatal vitamins.She is having migraine associate with her menstrual cycle and has seen neurology for evaluation. She also report some vocal hoarseness off and on in the past but feels like she may be getting a URI at this time. If her hoarseness persists after resolution of URI consult with her PCP or ENT suggested.    Nutrition assessment per patient report:   She generally consumes heart healthy nutrition.  Foods with fat/cholesterol (fried foods, fatty meats, junk food):  0 servings , pizza twice a month  Fruits and vegetables (  cup cooked, 1 cup raw):  4 servings per day  Caffeine (1 cup coffee, soda, etc):1-  2 servings per day, coffee with milk  Alcohol servings (12 oz. beer, 4 oz. wine, 1  oz. in mixed drink):  0  servings  Calcium servings (dairy foods, 8 oz. milk, yogurt, cheese, ice cream):  1 serving per day, some almond milk  Salt/sodium use:  light use  Special dietary habits:  None    Activity  Patient is active not regularly active other than walking with her normal activity during the day. Her son recently started sleeping through the night and she is getting more sleep but fatigue has been an issue. We discussed short periods of exercise durring the week, walking or activity with her son and longer on week-ends..    Laboratory Results Review  We discussed laboratory results today including lipids targets and how foods influence cholesterol.    Weight  Her perceived healthy weight is her current weight.  A normal BMI of 25 is equal to 147 pounds.  The current BMI of 22.5  is normal weight range.      PMH   Past Medical History:   Diagnosis Date     Unicornate uterus     ovulates every other month       PSH  Past Surgical History:   Procedure Laterality Date     NO HISTORY OF SURGERY         Current Meds   Current Outpatient Prescriptions   Medication Sig Dispense Refill     COMPRESSION STOCKINGS Knee high compression stockings   20-30mmHg such as TEDS hose (Patient not taking: Reported on 8/27/2018) 2 each 0     Multiple Vitamin (MULTIVITAMINS PO)          Allergies      Allergies   Allergen Reactions     Penicillins        Family Hx   Family History   Problem Relation Age of Onset     Hypertension Mother      Hyperlipidemia Mother      HEART DISEASE Father 51     MI with stent placement-50yo, CABG x 4 61yo     High cholesterol Father      Arrhythmia Father      ICD placed after open heart surgery     Osteoporosis Maternal Grandmother      Cerebrovascular Disease Paternal Grandmother      Hypertension Paternal Grandmother      Cancer Maternal Grandfather      bone marrow, leukemia     Diabetes Maternal Grandfather      Hypertension Maternal Grandfather      Alcoholism Maternal Grandfather      HEART DISEASE Paternal  "Grandfather 50     MI,      HEART DISEASE Paternal Uncle 50     x 2 brother     Migraines Sister        Social History  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  She is  with son age 4.     Enjoyment of life is 9 with 10 enjoys life fully.    Tobacco History  History   Smoking Status     Former Smoker     Quit date: 2006   Smokeless Tobacco     Never Used       ROS  CONSTITUTIONAL:  No fever, chills, or sweats. No weight gain/loss.   EENT:  No visual disturbance, ear ache, epistaxis, sore throat  ALLERGIES/IMMUNOLOGIC:  Negative  RESPIRATORY:  No cough, hemoptysis  CARDIOVASCULAR:  As per HPI  GI:  No nausea, vomiting, hematemesis, melena  :  No urinary frequency, dysuria, or hematuria  INTEGUMENT:  Negative  PSYCHIATRIC:  Negative  NEURO:  Negative  ENDOCRINE:  Negative  MUSCULOSKELETAL:  Negative     Vital Signs   /73 (BP Location: Left arm, Patient Position: Sitting, Cuff Size: Adult Regular)  Pulse 74  Ht 1.638 m (5' 4.5\")  Wt 59.4 kg (131 lb)  SpO2 98%  BMI 22.14 kg/m2      Waist: 26 inches  Hip: 35 inches    Physical Exam   In general, the patient is a pleasant female in no apparent distress.    HEENT: NC/AT.  PERRLA.  EOMI.  Sclerae white, not injected.  Nares clear.  Pharynx without erythema or exudate.  Dentition intact.    Neck: No adenopathy.  No thyromegaly.Carotids +4/4 bilaterally without bruits.  No jugular venous distension.   Lungs: CTA.  No ronchi, wheezes, rales.     Cor: RRR. Normal S1, S2 splits physiologically. No murmur, rub, click, or gallop. The PMI is in the 5th ICS in the midclavicular line. There is no heave.   Abdomen: Soft, nontender, nondistended. No organomegaly.  No bruits.   Extremities: No clubbing, cyanosis, or edema. The pulses are +2/2 at the post-tibial sites bilaterally. No bruits are noted.    Recent Labs  Lab Results   Component Value Date    GLC 88 2018      Lab Results   Component Value Date    " NTBNP 37 09/28/2018     No results found for: NTBNPI   Lab Results   Component Value Date    UCRR 60 09/28/2018      Lab Results   Component Value Date    MICROL <5 09/28/2018      No results found for: MICROALBUMIN   No results found for: CRP   Lab Results   Component Value Date    CHOL 213.0 (H) 05/15/2018      Lab Results   Component Value Date    TRIG 69.0 05/15/2018      Lab Results   Component Value Date    HDL 59.0 05/15/2018      Lab Results   Component Value Date    .0 (H) 05/15/2018      Lab Results   Component Value Date    VLDL 14.0 05/15/2018      Lab Results   Component Value Date    CHOLHDLRATIO 3.6 05/15/2018     No results found for: NHDL       [facility] Test Results    BASIC SPIROMETRY: Summary of two attempts (see printout for details of results)  Results Estimated range for ht/age   FVC: 3.21 liter FVC: 3.02- 4.40 liter   FEV1: 3.10 liter FEV1: 2.45-3.62 liter     History of asthma:  NO   History of respiratory infection current/recent:  NO     Spirometry Results:  normal      WALKING BLOOD PRESSURE RESPONSE (3 minute, 5 MET level walk)   Pre BP: 90/60 mmHg  3 min BP: 116/50 mmHg  1 min post BP: 112/70 mmHg    Pre HR: 71 bpm  3 min HR: 144 bpm  1 min post HR: 70 bpm       RETINAL VASCULAR ASSESSMENT   Left Eye Abnormality:  none  AV Ratio:0.85    Right Eye Abnormality:  none  AV Ratio: 0.91     Retinal Assessment:  normal      ABDOMINAL AORTA ULTRASOUND (< 2.5 normal, borderline 2.5-2.9, abnormal > 3)   SupraIliac 1.66 cm    SupraRenal 1.60 cm    InfraRenal Proximal 1.53 cm    InfraRenal Distal 1.67 cm      Abdominal Aorta Assessment:  normal      LEFT VENTRICULAR ULTRASOUND MEASUREMENTS (adjusted for BSA)  LVIDD 42 mm   Septa 7.6 mm   Posterior 7.1 mm     Left Ventricular US Assessment:  normal      Carotid Artery IMT measurements report and plaques in the small area examined:   Left IMT 0.540 mm  Plaques none    Right IMT 0.616 mm  Plaques none       ECG (see tracing):  normal sinus  rhythm;  rate: 74 bpm      Arterial Elasticity per age and gender (see printout):   C1 14.3 mL/mmHg x 10  normal   C2 5.2 mL/mmHg x 100 normal   Supine blood pressure: 129/75 mmHg       Assessment:     Cardiovascular:  ECG sinus rhythm rate 70. Palpitations at bed time described as irregularity for a few seconds consistent with occasional PVC's. We discussed caffeine avoidance but she also has migraine that improves with caffeine and she cannot take any med's currently since she is attempting pregnancy. She would rather have the palpitation than migraine.    Blood Pressure:  Normal range blood pressure without medication. Borderline small artery compliance.     Lipids:  LDL at 129 today lower than her previous results often 140-155 direct measurement. She is not on a statin which is contraindicated with current attempt at pregnancy. Depending on her results today will discuss need for future statin.    Health Habit Summary:  Nutrition: Heart Healthy Eating:  most of the time   Exercise:  semi active.  Weight:  normal weight range  Tobacco Use:  past tobacco use; 5 pack year history, quit in 2006.    Full report to follow prevention team review of test results with scanned final report.    Time spent for patient visit was 60 minutes with more than half the time spent on counseling and coordination of care.    PETER Mcmillan CNP       CC  Patient Care Team:  Mildred Becerra MD as PCP - General (Internal Medicine)  Luiza Waldrop MD as MD (Dermatology)  Jony Covington MD as MD (Neurology)  SELF, REFERRED  Answers for HPI/ROS submitted by the patient on 9/25/2018   General Symptoms: No  Skin Symptoms: Yes  HENT Symptoms: Yes  EYE SYMPTOMS: No  HEART SYMPTOMS: No  LUNG SYMPTOMS: No  INTESTINAL SYMPTOMS: No  URINARY SYMPTOMS: No  GYNECOLOGIC SYMPTOMS: No  BREAST SYMPTOMS: No  SKELETAL SYMPTOMS: No  BLOOD SYMPTOMS: No  NERVOUS SYSTEM SYMPTOMS: Yes  MENTAL HEALTH SYMPTOMS: No  Changes in hair:  Yes  Changes in moles/birth marks: Yes  Itching: No  Rashes: No  Changes in nails: No  Acne: No  Hair in places you don't want it: No  Change in facial hair: No  Warts: No  Non-healing sores: No  Scarring: No  Flaking of skin: No  Color changes of hands/feet in cold : No  Sun sensitivity: Yes  Skin thickening: No  Ear pain: No  Ear discharge: No  Hearing loss: No  Tinnitus: No  Nosebleeds: No  Congestion: No  Sinus pain: No  Trouble swallowing: No   Voice hoarseness: Yes  Mouth sores: No  Sore throat: No  Tooth pain: No  Gum tenderness: No  Bleeding gums: No  Change in taste: No  Change in sense of smell: No  Dry mouth: No  Hearing aid used: No  Neck lump: No  Trouble with coordination: No  Dizziness or trouble with balance: No  Fainting or black-out spells: No  Memory loss: No  Headache: Yes  Seizures: No  Speech problems: No  Tingling: No  Tremor: No  Weakness: No  Difficulty walking: No  Paralysis: No  Numbness: No

## 2018-09-28 NOTE — LETTER
9/28/2018      RE: Ophelia Vincent  [address]  [place] [address]       Dear Colleague,    Thank you for the opportunity to participate in the care of your patient, Ophelia Vincent, at the [facility] at [facility]. Please see a copy of my visit note below.    [facility] for Cardiovascular Disease Prevention - Exam Note    Active Problems   Patient Active Problem List    Diagnosis Date Noted     Migraine without aura and without status migrainosus, not intractable 08/27/2018     Priority: Medium     Migraine with aura 03/17/2016     Priority: Medium     Unicornate uterus 03/17/2016     Priority: Medium       Reason For Visit   Patient here for [facility] early detection of atherosclerosis and CVD exam.    Pain Evaluation  Current history of pain associated with this visit is: denied    HPI   Ophelia Vincent is a 37 year old year old female with a history of hyperlipidemia with LDL in the 150's, and family history of early coronary disease primary paternal with father and PGF and paternal uncles. Her father had an MI  Age 51 and at age 60 had 4 vessel CABG despite statin therapy.  She is concerned about her CV risk since she also has elevated lipids despite healthy living habits.  She has no chest pain but does have palpitations described as irregularity at bed time for a few seconds several times a week. She also has been having symptoms off and on of hair loss.  She is currently attempting pregnancy so she is only taking prenatal vitamins.She is having migraine associate with her menstrual cycle and has seen neurology for evaluation. She also report some vocal hoarseness off and on in the past but feels like she may be getting a URI at this time. If her hoarseness persists after resolution of URI consult with her PCP or ENT suggested.    Nutrition assessment per patient report:   She  generally consumes heart healthy nutrition.  Foods with fat/cholesterol (fried foods, fatty meats, junk food):  0 servings , pizza twice a month  Fruits and vegetables (  cup cooked, 1 cup raw):  4 servings per day  Caffeine (1 cup coffee, soda, etc):1-  2 servings per day, coffee with milk  Alcohol servings (12 oz. beer, 4 oz. wine, 1  oz. in mixed drink):  0 servings  Calcium servings (dairy foods, 8 oz. milk, yogurt, cheese, ice cream):  1 serving per day, some almond milk  Salt/sodium use:  light use  Special dietary habits:  None    Activity  Patient is active not regularly active other than walking with her normal activity during the day. Her son recently started sleeping through the night and she is getting more sleep but fatigue has been an issue. We discussed short periods of exercise durring the week, walking or activity with her son and longer on week-ends..    Laboratory Results Review  We discussed laboratory results today including lipids targets and how foods influence cholesterol.    Weight  Her perceived healthy weight is her current weight.  A normal BMI of 25 is equal to 147 pounds.  The current BMI of 22.5  is normal weight range.      PMH   Past Medical History:   Diagnosis Date     Unicornate uterus     ovulates every other month       PSH  Past Surgical History:   Procedure Laterality Date     NO HISTORY OF SURGERY         Current Meds   Current Outpatient Prescriptions   Medication Sig Dispense Refill     COMPRESSION STOCKINGS Knee high compression stockings   20-30mmHg such as TEDS hose (Patient not taking: Reported on 8/27/2018) 2 each 0     Multiple Vitamin (MULTIVITAMINS PO)          Allergies      Allergies   Allergen Reactions     Penicillins        Family Hx   Family History   Problem Relation Age of Onset     Hypertension Mother      Hyperlipidemia Mother      HEART DISEASE Father 51     MI with stent placement-52yo, CABG x 4 59yo     High cholesterol Father      Arrhythmia Father      " ICD placed after open heart surgery     Osteoporosis Maternal Grandmother      Cerebrovascular Disease Paternal Grandmother      Hypertension Paternal Grandmother      Cancer Maternal Grandfather      bone marrow, leukemia     Diabetes Maternal Grandfather      Hypertension Maternal Grandfather      Alcoholism Maternal Grandfather      HEART DISEASE Paternal Grandfather 50     MI,      HEART DISEASE Paternal Uncle 50     x 2 brother     Migraines Sister        Social History  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  She is  with son age 4.     Enjoyment of life is 9 with 10 enjoys life fully.    Tobacco History  History   Smoking Status     Former Smoker     Quit date: 2006   Smokeless Tobacco     Never Used       ROS  CONSTITUTIONAL:  No fever, chills, or sweats. No weight gain/loss.   EENT:  No visual disturbance, ear ache, epistaxis, sore throat  ALLERGIES/IMMUNOLOGIC:  Negative  RESPIRATORY:  No cough, hemoptysis  CARDIOVASCULAR:  As per HPI  GI:  No nausea, vomiting, hematemesis, melena  :  No urinary frequency, dysuria, or hematuria  INTEGUMENT:  Negative  PSYCHIATRIC:  Negative  NEURO:  Negative  ENDOCRINE:  Negative  MUSCULOSKELETAL:  Negative     Vital Signs   /73 (BP Location: Left arm, Patient Position: Sitting, Cuff Size: Adult Regular)  Pulse 74  Ht 1.638 m (5' 4.5\")  Wt 59.4 kg (131 lb)  SpO2 98%  BMI 22.14 kg/m2      Waist: 26 inches  Hip: 35 inches    Physical Exam   In general, the patient is a pleasant female in no apparent distress.    HEENT: NC/AT.  PERRLA.  EOMI.  Sclerae white, not injected.  Nares clear.  Pharynx without erythema or exudate.  Dentition intact.    Neck: No adenopathy.  No thyromegaly.Carotids +4/4 bilaterally without bruits.  No jugular venous distension.   Lungs: CTA.  No ronchi, wheezes, rales.     Cor: RRR. Normal S1, S2 splits physiologically. No murmur, rub, click, or gallop. The PMI is in the 5th " ICS in the midclavicular line. There is no heave.   Abdomen: Soft, nontender, nondistended. No organomegaly.  No bruits.   Extremities: No clubbing, cyanosis, or edema. The pulses are +2/2 at the post-tibial sites bilaterally. No bruits are noted.    Recent Labs  Lab Results   Component Value Date    GLC 88 09/28/2018      Lab Results   Component Value Date    NTBNP 37 09/28/2018     No results found for: NTBNPI   Lab Results   Component Value Date    UCRR 60 09/28/2018      Lab Results   Component Value Date    MICROL <5 09/28/2018      No results found for: MICROALBUMIN   No results found for: CRP   Lab Results   Component Value Date    CHOL 213.0 (H) 05/15/2018      Lab Results   Component Value Date    TRIG 69.0 05/15/2018      Lab Results   Component Value Date    HDL 59.0 05/15/2018      Lab Results   Component Value Date    .0 (H) 05/15/2018      Lab Results   Component Value Date    VLDL 14.0 05/15/2018      Lab Results   Component Value Date    CHOLHDLRATIO 3.6 05/15/2018     No results found for: NHDL       [facility] Test Results    BASIC SPIROMETRY: Summary of two attempts (see printout for details of results)  Results Estimated range for ht/age   FVC: 3.21 liter FVC: 3.02- 4.40 liter   FEV1: 3.10 liter FEV1: 2.45-3.62 liter     History of asthma:  NO   History of respiratory infection current/recent:  NO     Spirometry Results:  normal      WALKING BLOOD PRESSURE RESPONSE (3 minute, 5 MET level walk)   Pre BP: 90/60 mmHg  3 min BP: 116/50 mmHg  1 min post BP: 112/70 mmHg    Pre HR: 71 bpm  3 min HR: 144 bpm  1 min post HR: 70 bpm       RETINAL VASCULAR ASSESSMENT   Left Eye Abnormality:  none  AV Ratio:0.85    Right Eye Abnormality:  none  AV Ratio: 0.91     Retinal Assessment:  normal      ABDOMINAL AORTA ULTRASOUND (< 2.5 normal, borderline 2.5-2.9, abnormal > 3)   SupraIliac 1.66 cm    SupraRenal 1.60 cm    InfraRenal Proximal 1.53 cm    InfraRenal Distal 1.67 cm      Abdominal Aorta  Assessment:  normal      LEFT VENTRICULAR ULTRASOUND MEASUREMENTS (adjusted for BSA)  LVIDD 42 mm   Septa 7.6 mm   Posterior 7.1 mm     Left Ventricular US Assessment:  normal      Carotid Artery IMT measurements report and plaques in the small area examined:   Left IMT 0.540 mm  Plaques none    Right IMT 0.616 mm  Plaques none       ECG (see tracing):  normal sinus rhythm;  rate: 74 bpm      Arterial Elasticity per age and gender (see printout):   C1 14.3 mL/mmHg x 10  normal   C2 5.2 mL/mmHg x 100 normal   Supine blood pressure: 129/75 mmHg       Assessment:     Cardiovascular:  ECG sinus rhythm rate 70. Palpitations at bed time described as irregularity for a few seconds consistent with occasional PVC's. We discussed caffeine avoidance but she also has migraine that improves with caffeine and she cannot take any med's currently since she is attempting pregnancy. She would rather have the palpitation than migraine.    Blood Pressure:  Normal range blood pressure without medication. Borderline small artery compliance.     Lipids:  LDL at 129 today lower than her previous results often 140-155 direct measurement. She is not on a statin which is contraindicated with current attempt at pregnancy. Depending on her results today will discuss need for future statin.    Health Habit Summary:  Nutrition: Heart Healthy Eating:  most of the time   Exercise:  semi active.  Weight:  normal weight range  Tobacco Use:  past tobacco use; 5 pack year history, quit in 2006.    Full report to follow prevention team review of test results with scanned final report.    Time spent for patient visit was 60 minutes with more than half the time spent on counseling and coordination of care.    PETER Mcmillan CNP       CC  Patient Care Team:  Mildred Becerra MD as PCP - General (Internal Medicine)  Luiza Waldrop MD as MD (Dermatology)  Jony Covington MD as MD (Neurology)

## 2018-10-01 LAB — LPA SERPL-MCNC: 86 MG/DL (ref 0–30)

## 2018-10-02 ENCOUNTER — TELEPHONE (OUTPATIENT)
Dept: CARDIOLOGY | Facility: CLINIC | Age: 38
End: 2018-10-02

## 2018-10-02 LAB — INTERPRETATION ECG - MUSE: NORMAL

## 2018-10-02 NOTE — TELEPHONE ENCOUNTER
Discussed Lpa elevation of 86 and LDL levels from above desirable to borderline high in the past. Her early disease score at this time is low with low CV risk at this time but this could change as she ages. Recommend she start statin therapy to lower LDL and offset risk of Lpa when she is no longer attempting pregnancy.

## 2018-12-06 ENCOUNTER — TELEPHONE (OUTPATIENT)
Dept: FAMILY MEDICINE | Facility: CLINIC | Age: 38
End: 2018-12-06

## 2018-12-06 NOTE — TELEPHONE ENCOUNTER
----- Message from Lew Gracia sent at 12/4/2018  1:27 PM CST -----  Regarding: call back requested   Contact: [phone]  Ms for     Please call Pt back any time in the morning before 9am or noon. More time available on Thursday morning before 11am.  Two weeks of unsettling symptoms.    Thanks,      [facility]

## 2018-12-06 NOTE — TELEPHONE ENCOUNTER
"Spoke to pt- states that she is occasionally experiencing \"a little jolt \" - she had recent extensive cardiac testing with [facility], as her father had cardiac problems, and she wanted evaluation. States she was told tests were normal. She is concerned about current symptoms. States she also has been having increased stress, and thinks this might be related to that.  Advised she could call Nidhi Galvez NP that evaluated he recently in cardiology. Also offered appt with Dr Becerra - pt took appt with Dr Becerra tomorrow.   She will call back meghann Carney RN  Care Coordinator  [facility]  "

## 2019-09-10 NOTE — PROGRESS NOTES
"Ophelia \"Gretel\" Ramez Vnicent is here for a general check up. She is not fasting. She is due for an eye exam. She is up to date with dental visits. Wears seat belt-yes. Bike helmet- yes.  Concerns today:    KIMBERLY Majano is a 38-year-old female here for a general check-up. Tdap up to date until 2024. She follows ob/gyn for paps, up to date. She has been eating a plant-based diet. She does not eat meat or gluten.     Hair thinning  She has a history of hair thinning that originally began after her pregnancy 5 years ago. She has restarted taking ferrous gluconate 10 mg every other day. She is also taking vitamin b12, 5000mcg daily. She does not eat red meat. No spotting between menses. Her hemoglobin last year was 13.2 and her ferritin was 19.     Palpitations   She describes \"bursts\" of palpitations that come and go, usually at rest. These sometimes cause a pinching sensation of her left arm, making her gasp for breath, or numbness in her left arm. Her father had heart diease at age 51. She was evaluated by a preventive cardiologist in Sept 2018. She had an EKG. She was told that a heart monitor may be helpful, but no specific treatment was offered. She exercises 4x per week without difficulty.    Health Maintenance   Topic Date Due     PHQ-2  01/01/2019     INFLUENZA VACCINE (1) 09/01/2019     PREVENTIVE CARE VISIT  09/19/2020     HPV  05/24/2022     PAP  05/24/2022     DTAP/TDAP/TD IMMUNIZATION (2 - Td) 01/03/2024     HIV SCREENING  Completed     IPV IMMUNIZATION  Aged Out     MENINGITIS IMMUNIZATION  Aged Out         Patient Active Problem List   Diagnosis     Migraine with aura     Unicornate uterus     Migraine without aura and without status migrainosus, not intractable       Past Surgical History:   Procedure Laterality Date     HYSTEROSCOPY  02/2019       Family History   Problem Relation Age of Onset     Hypertension Mother      Hyperlipidemia Mother      Arrhythmia Mother         palpitations     Heart " Disease Father 51        MI with stent placement-52yo, CABG x 4 61yo     High cholesterol Father      Arrhythmia Father         ICD placed after open heart surgery     Osteoporosis Maternal Grandmother      Cerebrovascular Disease Paternal Grandmother 75     Hypertension Paternal Grandmother      Cancer Maternal Grandfather         bone marrow, leukemia     Diabetes Maternal Grandfather      Hypertension Maternal Grandfather      Alcoholism Maternal Grandfather      Heart Disease Paternal Grandfather 50        MI,      Heart Disease Paternal Uncle 50        x 2 brother     Migraines Sister        Social:     1 son, 4 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  Cycle: Regular, 28 days. 5 days of bleeding with the first 2 being heavy, some passage of clots.   Hx abnormal pap? None.  STD hx? no  dysmenorrhea/PMS: yes, 1 wk prior to menses, migraine, Excederin  Vasomotor sx: no  Contraception: none, open to pregnancy   G 1 P 1 A 0  Self Breast exam: yes    Current Outpatient Medications   Medication Sig Dispense Refill     Cholecalciferol (VITAMIN D PO) Take 1 tablet by mouth daily       COMPRESSION STOCKINGS Knee high compression stockings   20-30mmHg such as TEDS hose 2 each 0     Multiple Vitamin (MULTIVITAMINS PO)        Prenatal MV-Min-Fe Fum-FA-DHA (PRENATAL 1 PO) Take 1 tablet by mouth daily       Allergies   Allergen Reactions     Penicillins          ROS  CONSTITUTIONAL:NEGATIVE for fever, chills, change in weight  INTEGUMENTARY/SKIN: NEGATIVE for worrisome rashes, moles or lesions POS for hair thinning  EYES: NEGATIVE for vision changes or irritation  ENT/MOUTH: NEGATIVE for ear, mouth and throat problems  RESP:NEGATIVE for significant cough or SOB  BREAST: NEGATIVE for masses, tenderness or discharge  CV:  "NEGATIVE for chest pain, palpitations, RICKETTS, orthopnea, PND  or peripheral edema  GI: NEGATIVE for nausea, abdominal pain, heartburn, or change in bowel habits  :NEGATIVE for frequency, dysuria, or hematuria  MUSCULOSKELETAL:NEGATIVE for significant arthralgias or myalgia  NEURO: NEGATIVE for weakness, dizziness or paresthesias  ENDOCRINE: NEGATIVE for polyuria/dipsia,  temperature intolerance, skin/hair changes  HEME/ALLERGY/IMMUNE: NEGATIVE for bleeding problems  PSYCHIATRIC: NEGATIVE for changes in mood or affect, some grief surrounding issue of infertility.     EXAM  /84 (BP Location: Left arm, Patient Position: Sitting, Cuff Size: Adult Regular)   Pulse 64   Temp 98.6  F (37  C) (Oral)   Resp 16   Ht 1.644 m (5' 4.72\")   Wt 57 kg (125 lb 12 oz)   LMP 08/30/2019 (Exact Date)   SpO2 98%   BMI 21.10 kg/m    GENERAL APPEARANCE: Alert, pleasant, NAD  EYES: PERRL, EOMI, conjunctiva clear  HENT: TM normal bilaterally. Nose and mouth without lesions  NECK: no adenopathy, thyroid normal to palpation   RESP: lungs clear to auscultation bilaterally, no wheezing or crackles  CV: regular rate and rhythm, normal S1 S2, no murmur, no carotid bruits  ABDOMEN: soft, nontender, without HSM or masses. Bowel sounds normal  MS: extremities normal- no gross deformities noted, no tender, hot or swollen joints.    SKIN: no suspicious lesions or rashes. 0.5 cm x 0.5 cm punduculated brown papule at posterior right arm.  NEURO: Normal strength and tone, sensory exam grossly normal, DTR normoreflexive in upper and lower extremities  PSYCH: mentation appears normal. and affect normal/bright.  EXT: no peripheral edema, pedal pulses palpable    Assessment:  (Z00.00) Routine general medical examination at a health care facility  (primary encounter diagnosis)  Comment: Gretel is a 38 year old female in stable health. She is up to date with HCM. Follows ob/gyn for pap.  Plan: Comprehensive Metabolic Panel ([facility]), Vitamin        " D Deficiency        Anticipatory guidance given today regarding diet, exercise, calcium intake. She will return to clinic for labs. Flu vaccine when available.    (Z13.220) Screening for lipid disorders  Comment: Routine screening, will return to clinic for fasting labs  Plan: Lipid Panel ([facility])            (L65.9) Hair thinning  Comment: Hx of hair thinning, has taken iron with improvement in the past.  Plan: CBC with Plt ([facility]), Ferritin, Iron and iron         binding capacity, TSH        Will RTC for labs    (R00.2) Palpitations  Comment: Fleeting palpitations with associated left arm numbness at rest. Negative cardiac work-up in 9/2018, symptoms persist.  Plan: EVENT MONITOR PER 30 DAYS, RECORDING,         ECG/REVIEW,INTERPRET ONLY        Discussed event monitor , will have her follow up in clinic once report returns.     (L98.9) Skin lesion  Comment: Benign appearing mole  Plan: DERMATOLOGY REFERRAL        May see dermatology if needed over the next year if there are changes.       Mildred Becerra MD  Internal Medicine/Pediatrics    I, Troy Nowak, am serving as a scribe to document services personally performed by Dr. Mildred Becerra, based on data collection and the provider's statements to me. Dr. Becerra has reviewed, edited and approved the above note.

## 2019-09-19 ENCOUNTER — OFFICE VISIT (OUTPATIENT)
Dept: FAMILY MEDICINE | Facility: CLINIC | Age: 39
End: 2019-09-19
Payer: COMMERCIAL

## 2019-09-19 VITALS
SYSTOLIC BLOOD PRESSURE: 117 MMHG | TEMPERATURE: 98.6 F | HEART RATE: 64 BPM | RESPIRATION RATE: 16 BRPM | WEIGHT: 125.75 LBS | OXYGEN SATURATION: 98 % | BODY MASS INDEX: 20.95 KG/M2 | HEIGHT: 65 IN | DIASTOLIC BLOOD PRESSURE: 84 MMHG

## 2019-09-19 DIAGNOSIS — L65.9 HAIR THINNING: ICD-10-CM

## 2019-09-19 DIAGNOSIS — L98.9 SKIN LESION: ICD-10-CM

## 2019-09-19 DIAGNOSIS — Z13.220 SCREENING FOR LIPID DISORDERS: ICD-10-CM

## 2019-09-19 DIAGNOSIS — R00.2 PALPITATIONS: ICD-10-CM

## 2019-09-19 DIAGNOSIS — Z00.00 ROUTINE GENERAL MEDICAL EXAMINATION AT A HEALTH CARE FACILITY: Primary | ICD-10-CM

## 2019-09-19 ASSESSMENT — MIFFLIN-ST. JEOR: SCORE: 1236.89

## 2019-09-19 NOTE — NURSING NOTE
"38 year old  Chief Complaint   Patient presents with     Physical     no concerns       Blood pressure 117/84, pulse 64, temperature 98.6  F (37  C), temperature source Oral, resp. rate 16, height 1.644 m (5' 4.72\"), weight 57 kg (125 lb 12 oz), last menstrual period 2019, SpO2 98 %, not currently breastfeeding. Body mass index is 21.1 kg/m .  Patient Active Problem List   Diagnosis     Migraine with aura     Unicornate uterus     Migraine without aura and without status migrainosus, not intractable       Wt Readings from Last 2 Encounters:   19 57 kg (125 lb 12 oz)   18 59.4 kg (131 lb)     BP Readings from Last 3 Encounters:   19 117/84   18 116/73   18 122/71         Current Outpatient Medications   Medication     Cholecalciferol (VITAMIN D PO)     COMPRESSION STOCKINGS     Multiple Vitamin (MULTIVITAMINS PO)     Prenatal MV-Min-Fe Fum-FA-DHA (PRENATAL 1 PO)     No current facility-administered medications for this visit.        Social History     Tobacco Use     Smoking status: Former Smoker     Packs/day: 1.00     Years: 5.00     Pack years: 5.00     Last attempt to quit: 2006     Years since quittin.0     Smokeless tobacco: Never Used   Substance Use Topics     Alcohol use: Yes     Comment: 2-3 per week     Drug use: No       Health Maintenance Due   Topic Date Due     PHQ-2  2019     INFLUENZA VACCINE (1) 2019       No results found for: PAP      2019 1:39 PM    "

## 2019-09-26 DIAGNOSIS — Z00.00 ROUTINE GENERAL MEDICAL EXAMINATION AT A HEALTH CARE FACILITY: ICD-10-CM

## 2019-09-26 DIAGNOSIS — Z13.220 SCREENING FOR LIPID DISORDERS: ICD-10-CM

## 2019-09-26 DIAGNOSIS — L65.9 HAIR THINNING: ICD-10-CM

## 2019-09-26 LAB
ALBUMIN SERPL-MCNC: 3.8 G/DL (ref 3.3–4.6)
ALP SERPL-CCNC: 44 U/L (ref 40–150)
ALT SERPL-CCNC: 26 U/L (ref 0–50)
AST SERPL-CCNC: 28 U/L (ref 0–45)
BILIRUB SERPL-MCNC: 0.8 MG/DL (ref 0.2–1.3)
BUN SERPL-MCNC: 11 MG/DL (ref 5–24)
CALCIUM SERPL-MCNC: 9.6 MG/DL (ref 8.5–10.4)
CHLORIDE SERPLBLD-SCNC: 102 MMOL/L (ref 94–109)
CHOLEST SERPL-MCNC: 208 MG/DL (ref 0–200)
CHOLEST/HDLC SERPL: 3.6 {RATIO} (ref 0–5)
CO2 SERPL-SCNC: 28 MMOL/L (ref 20–32)
CREAT SERPL-MCNC: 0.8 MG/DL (ref 0.6–1.3)
DEPRECATED CALCIDIOL+CALCIFEROL SERPL-MC: 50 UG/L (ref 20–75)
EGFR CALCULATED (BLACK REFERENCE): 102.2
EGFR CALCULATED (NON BLACK REFERENCE): 84.4
ERYTHROCYTE [DISTWIDTH] IN BLOOD BY AUTOMATED COUNT: 12 %
FASTING SPECIMEN: YES
FERRITIN SERPL-MCNC: 18 NG/ML (ref 12–150)
GLUCOSE SERPL-MCNC: 96 MG/DL (ref 60–99)
HCT VFR BLD AUTO: 40 % (ref 35–47)
HDLC SERPL-MCNC: 58 MG/DL
HEMOGLOBIN: 13.2 G/DL (ref 11.7–15.7)
IRON SATN MFR SERPL: 43 % (ref 15–46)
IRON SERPL-MCNC: 112 UG/DL (ref 35–180)
LDLC SERPL CALC-MCNC: 135 MG/DL (ref 0–129)
MCH RBC QN AUTO: 30.1 PG (ref 26.5–35)
MCHC RBC AUTO-ENTMCNC: 33 G/DL (ref 32–36)
MCV RBC AUTO: 91.3 FL (ref 78–100)
PLATELET # BLD AUTO: 243 K/UL (ref 150–450)
POTASSIUM SERPL-SCNC: 4.4 MMOL/L (ref 3.4–5.3)
PROT SERPL-MCNC: 7.3 G/DL (ref 6.8–8.8)
RBC # BLD AUTO: 4.38 M/UL (ref 3.8–5.2)
SODIUM SERPL-SCNC: 141 MMOL/L (ref 137.3–146.3)
TIBC SERPL-MCNC: 264 UG/DL (ref 240–430)
TRIGL SERPL-MCNC: 70 MG/DL (ref 0–150)
TSH SERPL DL<=0.005 MIU/L-ACNC: 1.21 MU/L (ref 0.4–4)
VLDL-CHOLESTEROL: 14 (ref 7–32)
WBC # BLD AUTO: 5.1 K/UL (ref 4–11)

## 2019-10-03 DIAGNOSIS — R00.2 PALPITATIONS: Primary | ICD-10-CM

## 2019-10-24 ENCOUNTER — OFFICE VISIT (OUTPATIENT)
Dept: DERMATOLOGY | Facility: CLINIC | Age: 39
End: 2019-10-24
Payer: COMMERCIAL

## 2019-10-24 DIAGNOSIS — D18.01 CHERRY ANGIOMA: ICD-10-CM

## 2019-10-24 DIAGNOSIS — Z12.83 SKIN CANCER SCREENING: Primary | ICD-10-CM

## 2019-10-24 DIAGNOSIS — L82.1 SEBORRHEIC KERATOSES: ICD-10-CM

## 2019-10-24 ASSESSMENT — PAIN SCALES - GENERAL: PAINLEVEL: NO PAIN (0)

## 2019-10-24 NOTE — NURSING NOTE
Dermatology Rooming Note    Ophelia Vincent's goals for this visit include:   Chief Complaint   Patient presents with     Skin Check     Gretel is her today for a skin check- Gretel notes an area of concern.      ZAMZAM Hammond

## 2019-10-24 NOTE — PROGRESS NOTES
[facility] Dermatology Note      Dermatology Problem List:  1. Multiple benign nevi  2. SKs.   3. Cherry angiomas    Encounter Date: Oct 24, 2019    CC:  Chief Complaint   Patient presents with     Skin Check     Gretel is her today for a skin check- Gretel notes an area of concern.          History of Present Illness:  Ms. Ophelia Vincent is a 39 year old female who is a return patient to the clinic and presents for a skin check. The patient was last seen on 01/04/2018 by Dr. Lau for an unremarkable skin check. At today's visit, the patient notes cyst like lesions of concern in the right armpit area. She states that these lesions are not irritating or bothersome, but is worried because they appeared within the last year or two. The patient denies a personal and family history of skin cancer. The patient denies painful, itching, tingling or bleeding lesions unless otherwise noted.    Past Medical History:   Patient Active Problem List   Diagnosis     Migraine with aura     Unicornate uterus     Migraine without aura and without status migrainosus, not intractable     Past Medical History:   Diagnosis Date     Family history of ischemic heart disease      Hyperlipidemia      Migraine     associated with mentral cycle     Unicornate uterus     ovulates every other month     Vitamin D deficiency      Past Surgical History:   Procedure Laterality Date     HYSTEROSCOPY  02/2019       Social History:   reports that she quit smoking about 13 years ago. She has a 5.00 pack-year smoking history. She has never used smokeless tobacco. She reports current alcohol use. She reports that she does not use drugs.    Family History:  Family History   Problem Relation Age of Onset     Hypertension Mother      Hyperlipidemia Mother      Arrhythmia Mother         palpitations     Heart Disease Father 51        MI with stent placement-50yo, CABG x 4 61yo     High cholesterol Father      Arrhythmia Father          ICD placed after open heart surgery     Osteoporosis Maternal Grandmother      Cerebrovascular Disease Paternal Grandmother 75     Hypertension Paternal Grandmother      Cancer Maternal Grandfather         bone marrow, leukemia     Diabetes Maternal Grandfather      Hypertension Maternal Grandfather      Alcoholism Maternal Grandfather      Heart Disease Paternal Grandfather 50        MI,      Heart Disease Paternal Uncle 50        x 2 brother     Migraines Sister        Medications:  Current Outpatient Medications   Medication Sig Dispense Refill     Cholecalciferol (VITAMIN D PO) Take 1 tablet by mouth daily       COMPRESSION STOCKINGS Knee high compression stockings   20-30mmHg such as TEDS hose 2 each 0     Prenatal MV-Min-Fe Fum-FA-DHA (PRENATAL 1 PO) Take 1 tablet by mouth daily         Allergies   Allergen Reactions     Penicillins        Review of Systems:  -Constitutional: The patient denies fatigue, fevers, chills, unintended weight loss, and night sweats.  -HEENT: Patient denies nonhealing oral sores.  -Skin: As above in HPI. No additional skin concerns.    Physical exam:  Vitals: There were no vitals taken for this visit.  GEN: This is a well developed, well-nourished female in no acute distress, in a pleasant mood.    SKIN: Full skin, which includes the head/face, both arms, chest, back, abdomen,both legs, genitalia and/or groin buttocks, digits and/or nails, was examined.  There is a waxy stuck on tan to brown papule with milia-like cysts in the right axillae.  -Kwan's skin type II, less than 100 nevi.  -There are dome shaped bright red papules on the trunk and extremities.   -There are waxy stuck on tan to brown papules on the trunk and extremities.  -No other lesions of concern on areas examined.       Impression/Plan:  1. Skin cancer screening    ABCDs of melanoma were discussed and self skin checks were advised.    Sun precaution was advised including the use of sun screens of SPF 30  or higher, sun protective clothing, and avoidance of tanning beds.    Provided sunscreen, melanoma and sun protective clothing handouts    2. Cherry angioma(s)    Reassured benign    No further intervention required. Patient to report changes.     3. Seborrheic keratosis, non irritated    Reassured benign    No further intervention required. Patient to report changes.     CC Dr. Palmer on close of this encounter.  Follow-up in 2 years, earlier for new or changing lesions.       Staff Involved:  Staff/Scribe    Scribe Disclosure:  I, Eligio Godoy, am serving as a scribe to document services personally performed by Marivel Brady PA-C, based on data collection and the provider's statements to me.     Provider Disclosure:   The documentation recorded by the scribe accurately reflects the services I personally performed and the decisions made by me.    All risks, benefits and alternatives were discussed with patient.  Patient is in agreement and understands the assessment and plan.  All questions were answered.    Marivel Brady PA-C  [facility]: Phone: [phone], Fax: [phone]

## 2019-10-24 NOTE — PATIENT INSTRUCTIONS
Sun protective clothing and Resources     L[place] (www.Kore [email or web address] [email or web address])  Athleta (www.athleta.Sprinklr)  Roque Life (www.zoojoo.BEanalMetanautix.Sprinklr)  Carve Designs (Vello App) - affordable  Skinz (uvskinz.com)    Long sleeve - Gregory Cool DRI UPF 50 or [place] PFG UPF 50  Hoodie - [place] PFG UPF 50  Swimshirt/Rash Guard - Rosy UPF 50 (on Amazon)  Neck - Outdoor Research Ubertubes (www.outdoorresZPower.com)  The ABCDEs of Melanoma    Skin cancer can develop anywhere on the skin. Ask someone for help when checking your skin, especially in hard to see places. If you notice a mole different from others, or that changes, enlarges, itches, or bleeds (even if it is small), you should see a dermatologist.

## 2019-10-24 NOTE — LETTER
10/24/2019       RE: Ophelia Vincent  [address]  [place] [address]     Dear Colleague,    Thank you for referring your patient, Ophelia Vincent, to the [facility] DERMATOLOGY at [facility]. Please see a copy of my visit note below.    [facility] Dermatology Note      Dermatology Problem List:  1. Multiple benign nevi  2. SKs.   3. Cherry angiomas    Encounter Date: Oct 24, 2019    CC:  Chief Complaint   Patient presents with     Skin Check     Gretel is her today for a skin check- Gretel notes an area of concern.          History of Present Illness:  Ms. Ophelia Vincent is a 39 year old female who is a return patient to the clinic and presents for a skin check. The patient was last seen on 01/04/2018 by Dr. Lau for an unremarkable skin check. At today's visit, the patient notes cyst like lesions of concern in the right armpit area. She states that these lesions are not irritating or bothersome, but is worried because they appeared within the last year or two. The patient denies a personal and family history of skin cancer. The patient denies painful, itching, tingling or bleeding lesions unless otherwise noted.    Past Medical History:   Patient Active Problem List   Diagnosis     Migraine with aura     Unicornate uterus     Migraine without aura and without status migrainosus, not intractable     Past Medical History:   Diagnosis Date     Family history of ischemic heart disease      Hyperlipidemia      Migraine     associated with mentral cycle     Unicornate uterus     ovulates every other month     Vitamin D deficiency      Past Surgical History:   Procedure Laterality Date     HYSTEROSCOPY  02/2019       Social History:   reports that she quit smoking about 13 years ago. She has a 5.00 pack-year smoking history. She has never used smokeless tobacco. She reports current alcohol use. She reports that she does not use  drugs.    Family History:  Family History   Problem Relation Age of Onset     Hypertension Mother      Hyperlipidemia Mother      Arrhythmia Mother         palpitations     Heart Disease Father 51        MI with stent placement-52yo, CABG x 4 59yo     High cholesterol Father      Arrhythmia Father         ICD placed after open heart surgery     Osteoporosis Maternal Grandmother      Cerebrovascular Disease Paternal Grandmother 75     Hypertension Paternal Grandmother      Cancer Maternal Grandfather         bone marrow, leukemia     Diabetes Maternal Grandfather      Hypertension Maternal Grandfather      Alcoholism Maternal Grandfather      Heart Disease Paternal Grandfather 50        MI,      Heart Disease Paternal Uncle 50        x 2 brother     Migraines Sister        Medications:  Current Outpatient Medications   Medication Sig Dispense Refill     Cholecalciferol (VITAMIN D PO) Take 1 tablet by mouth daily       COMPRESSION STOCKINGS Knee high compression stockings   20-30mmHg such as TEDS hose 2 each 0     Prenatal MV-Min-Fe Fum-FA-DHA (PRENATAL 1 PO) Take 1 tablet by mouth daily         Allergies   Allergen Reactions     Penicillins        Review of Systems:  -Constitutional: The patient denies fatigue, fevers, chills, unintended weight loss, and night sweats.  -HEENT: Patient denies nonhealing oral sores.  -Skin: As above in HPI. No additional skin concerns.    Physical exam:  Vitals: There were no vitals taken for this visit.  GEN: This is a well developed, well-nourished female in no acute distress, in a pleasant mood.    SKIN: Full skin, which includes the head/face, both arms, chest, back, abdomen,both legs, genitalia and/or groin buttocks, digits and/or nails, was examined.  There is a waxy stuck on tan to brown papule with milia-like cysts in the right axillae.  -Kwan's skin type II, less than 100 nevi.  -There are dome shaped bright red papules on the trunk and extremities.   -There are  waxy stuck on tan to brown papules on the trunk and extremities.  -No other lesions of concern on areas examined.       Impression/Plan:  1. Skin cancer screening    ABCDs of melanoma were discussed and self skin checks were advised.    Sun precaution was advised including the use of sun screens of SPF 30 or higher, sun protective clothing, and avoidance of tanning beds.    Provided sunscreen, melanoma and sun protective clothing handouts    2. Cherry angioma(s)    Reassured benign    No further intervention required. Patient to report changes.     3. Seborrheic keratosis, non irritated    Reassured benign    No further intervention required. Patient to report changes.     CC Dr. Palmer on close of this encounter.  Follow-up in 2 years, earlier for new or changing lesions.       Staff Involved:  Staff/Scribe    Scribe Disclosure:  I, Eligio Godoy, am serving as a scribe to document services personally performed by Marivel Brady PA-C, based on data collection and the provider's statements to me.     Provider Disclosure:   The documentation recorded by the scribe accurately reflects the services I personally performed and the decisions made by me.    All risks, benefits and alternatives were discussed with patient.  Patient is in agreement and understands the assessment and plan.  All questions were answered.    Marivel Brady PA-C  [facility]: Phone: [phone], Fax: [phone]

## 2019-11-07 ENCOUNTER — ANCILLARY PROCEDURE (OUTPATIENT)
Dept: CARDIOLOGY | Facility: CLINIC | Age: 39
End: 2019-11-07
Attending: INTERNAL MEDICINE
Payer: COMMERCIAL

## 2019-11-07 DIAGNOSIS — R00.2 PALPITATIONS: ICD-10-CM

## 2019-11-07 PROCEDURE — 93272 ECG/REVIEW INTERPRET ONLY: CPT | Performed by: INTERNAL MEDICINE

## 2019-11-07 PROCEDURE — 93270 REMOTE 30 DAY ECG REV/REPORT: CPT | Mod: ZF

## 2019-11-07 NOTE — PROGRESS NOTES
Per Dr. Becerra, patient to have 30 day Bio-Tel monitor placed.  Diagnosis: palpitations  Monitor placed: Yes  Patient Instructed: Yes  Patient verbalized understanding: Yes  Holter # [id number]   Kit #[id number]  WP

## 2020-01-09 ENCOUNTER — TELEPHONE (OUTPATIENT)
Dept: FAMILY MEDICINE | Facility: CLINIC | Age: 40
End: 2020-01-09

## 2020-01-09 NOTE — TELEPHONE ENCOUNTER
Spoke to pt - states she has discomfort left lower back  radiating from tailbone area. Notes no injury; does sit most of the day and not sure if it is related to posture. She has used ibuprofen and bought Icy Hot patch - has offered some relief. Pt able to ambulate well; no sciatica noted. She will try icing area.Gave pt info about [facility] - she may consider going there if symptoms worsen.  She will call back later with any questions.  Ghislaine Carney RN  [facility]

## 2020-01-09 NOTE — TELEPHONE ENCOUNTER
[facility] Call Center    Phone Message    May a detailed message be left on voicemail: yes    Reason for Call: Symptoms or Concerns     If patient has red-flag symptoms, warm transfer to triage line    Current symptom or concern: Per Pt, Think possibly pulled a muscle, Pt states having a hard time getting in the car, and also feel a little pain in back. Pt is wanting to know what she should do, Pt states there is no injury associated with the pulled muscle     Symptoms have been present for:  1, last night 1/8/2019 day(s)    Has patient previously been seen for this? No    By Mildred Chaidez    Date: 1/9/2020    Are there any new or worsening symptoms? No      Action Taken: Message routed to:  [facility]: [facility]

## 2020-02-25 ENCOUNTER — OFFICE VISIT (OUTPATIENT)
Dept: FAMILY MEDICINE | Facility: CLINIC | Age: 40
End: 2020-02-25
Payer: COMMERCIAL

## 2020-02-25 ENCOUNTER — TELEPHONE (OUTPATIENT)
Dept: FAMILY MEDICINE | Facility: CLINIC | Age: 40
End: 2020-02-25

## 2020-02-25 VITALS
WEIGHT: 128.25 LBS | DIASTOLIC BLOOD PRESSURE: 82 MMHG | BODY MASS INDEX: 21.52 KG/M2 | TEMPERATURE: 97.7 F | RESPIRATION RATE: 15 BRPM | OXYGEN SATURATION: 100 % | SYSTOLIC BLOOD PRESSURE: 124 MMHG | HEART RATE: 78 BPM

## 2020-02-25 DIAGNOSIS — R10.31 RLQ ABDOMINAL PAIN: Primary | ICD-10-CM

## 2020-02-25 LAB
BILIRUBIN UR: NEGATIVE MG/DL
BLOOD UR: NEGATIVE MG/DL
GLUCOSE URINE: NEGATIVE
HCG UR QL: NEGATIVE
KETONES UR QL: ABNORMAL MG/DL
LEUKOCYTE ESTERASE UR: ABNORMAL
NITRITE UR QL STRIP: NEGATIVE MG/DL
PH UR STRIP: 7 [PH] (ref 4.5–8)
PROTEIN UR: NEGATIVE MG/DL
SP GR UR STRIP: 1 (ref 1–1)
UROBILINOGEN UR STRIP-ACNC: ABNORMAL E.U./DL

## 2020-02-25 NOTE — NURSING NOTE
39 year old  Chief Complaint   Patient presents with     Abdominal Pain     x 1 day, with some hot flashes period is 2 days late       Blood pressure 124/82, pulse 78, temperature 97.7  F (36.5  C), temperature source Oral, resp. rate 15, weight 58.2 kg (128 lb 4 oz), last menstrual period 2020, SpO2 100 %, not currently breastfeeding. Body mass index is 21.52 kg/m .  Patient Active Problem List   Diagnosis     Migraine with aura     Unicornate uterus     Migraine without aura and without status migrainosus, not intractable       Wt Readings from Last 2 Encounters:   20 58.2 kg (128 lb 4 oz)   19 57 kg (125 lb 12 oz)     BP Readings from Last 3 Encounters:   20 124/82   19 117/84   18 116/73         Current Outpatient Medications   Medication     Cholecalciferol (VITAMIN D PO)     COMPRESSION STOCKINGS     Prenatal MV-Min-Fe Fum-FA-DHA (PRENATAL 1 PO)     No current facility-administered medications for this visit.        Social History     Tobacco Use     Smoking status: Former Smoker     Packs/day: 1.00     Years: 5.00     Pack years: 5.00     Last attempt to quit: 2006     Years since quittin.5     Smokeless tobacco: Never Used   Substance Use Topics     Alcohol use: Yes     Comment: 2-3 per week     Drug use: No       Health Maintenance Due   Topic Date Due     INFLUENZA VACCINE (1) 2019     PHQ-2  2020       No results found for: PAP      2020 1:46 PM

## 2020-02-25 NOTE — TELEPHONE ENCOUNTER
"Spoke to pt - states she noted abdominal discomfort last evening - mid to right side of abd.  Rates pain as 3-4; is intermittent. States she had \"good BM\" this AM. Has been able to eat and drink. Has noticed pain keeps \"coming back\". States she feels the pain when area is palpated.  Denies fever or illness. States her period is 2 days late, and that she took home pregnancy test Sunday, that was negative.   Advised pt could continue to monitor symptoms at home, and make appt if symptoms persist or worsen,  Pt requests appt today as she has \" a very busy schedule tomorrow\"  - appt made with Dr Cole at 1:40 pm.  Pt agrees with plan.  Ghislaine Carney RN  [facility]  "

## 2020-02-25 NOTE — PROGRESS NOTES
"  SUBJECTIVE:   Ophelia Vincent is a 39 year old female who presents to clinic today for a return visit.    - started last night  - pain is constant in RLQ  - also intermittent pains elsewhere in abdomen  - slept fine but did wake her up in the morning  - was a little better this morning, then she ate and feels it more  - \"almost feels like indigestion\"  - no changes in her diet  - had a normal BM this morning, no blood or melena  - maybe felt a little better after BM, then pain came back    - no pain with urination or urgency  - no change in vaginal discharge or bleeding  - LMP was 1/30/20  - had negative UPT 2 days ago  - periods are very regular, every 28 days  - not on contraception and is sexually active    ROS: Denies fevers, chills, chest pain, difficulty breathing    Patient Active Problem List   Diagnosis     Migraine with aura     Unicornate uterus     Migraine without aura and without status migrainosus, not intractable     Current Outpatient Medications   Medication     Cholecalciferol (VITAMIN D PO)     COMPRESSION STOCKINGS     Prenatal MV-Min-Fe Fum-FA-DHA (PRENATAL 1 PO)     No current facility-administered medications for this visit.        I have reviewed the patient's relevant past medical history.     OBJECTIVE:   /82 (BP Location: Left arm, Patient Position: Sitting, Cuff Size: Adult Regular)   Pulse 78   Temp 97.7  F (36.5  C) (Oral)   Resp 15   Wt 58.2 kg (128 lb 4 oz)   LMP 01/30/2020 (Exact Date)   SpO2 100%   BMI 21.52 kg/m      Constitutional: well-appearing, appears stated age  Eyes: conjunctivae without erythema, sclera anicteric.   Abdomen: mildly TTP in RLQ and suprapubic area. Normal bowel sounds. No rebound tenderness or guarding. Pain lessens with tensing abdominal muscles.  Skin: no rashes, lesions, or wounds  Psych: affect is full and appropriate, speech is fluent and non-pressured    Results for orders placed or performed in visit on 02/25/20   HCG " Qualitative Urine ([facility]DA[facility])     Status: None   Result Value Ref Range    HCG Qual Urine Negative Negative   Urinalysis ([place])     Status: Abnormal   Result Value Ref Range    Leukocyte Esterase UR Trace (A) Negative    Nitrite Urine Negative Negative mg/dL    Protein UR Negative Negative mg/dL    Glucose Urine Negative Negative    Ketones Urine 1+ (A) Negative mg/dL    Urobilinogen mg/dL 0.2 E.U./dL 0.2 E.U./dL E.U./dL    Bilirubin UR Negative Negative mg/dL    Blood UR Negative Negative mg/dL    pH Urine 7.0 4.5 - 8.0    Specific Gravity Urine 1.0 1.0 - 1.0     ASSESSMENT AND PLAN:     (R10.31) RLQ abdominal pain  (primary encounter diagnosis)  Comment: Differential includes intestinal cramping/bowel gas, appendicitis, ovarian pathology, ectopic pregnancy, cystitis. Ectopic unlikely with now 2 negative UPTs. Cystitis unlikely with no urinary symptoms and only trace LE on UA. Would expect more significant pain, other symptoms, or more acute abdominal exam with ovarian torsion, hemorrhagic cyst, or appendicitis. For now, encouraged NSAIDs and heating pad. If worsens, would want a call back and would discuss ED vs CT and labs. Similarly, if not improving over the next few days would likely need to come back for repeat exam and discussion of labs/imaging.     Plan: HCG Qualitative Urine ([facility]), Urinalysis         ([place])            Leandro Cole MD   [facility]  02/25/2020, 2:13 PM

## 2020-03-10 ENCOUNTER — HEALTH MAINTENANCE LETTER (OUTPATIENT)
Age: 40
End: 2020-03-10

## 2020-12-20 ENCOUNTER — HEALTH MAINTENANCE LETTER (OUTPATIENT)
Age: 40
End: 2020-12-20

## 2021-08-28 ENCOUNTER — MYC MEDICAL ADVICE (OUTPATIENT)
Dept: FAMILY MEDICINE | Facility: CLINIC | Age: 41
End: 2021-08-28

## 2021-08-28 DIAGNOSIS — Z01.00 ENCOUNTER FOR ROUTINE EYE AND VISION EXAMINATION: ICD-10-CM

## 2021-08-28 DIAGNOSIS — D22.9 ATYPICAL MOLE: Primary | ICD-10-CM

## 2021-10-03 ENCOUNTER — HEALTH MAINTENANCE LETTER (OUTPATIENT)
Age: 41
End: 2021-10-03

## 2021-10-14 ENCOUNTER — OFFICE VISIT (OUTPATIENT)
Dept: OPHTHALMOLOGY | Facility: CLINIC | Age: 41
End: 2021-10-14
Payer: COMMERCIAL

## 2021-10-14 DIAGNOSIS — H52.203 HYPEROPIA WITH ASTIGMATISM AND PRESBYOPIA, BILATERAL: Primary | ICD-10-CM

## 2021-10-14 DIAGNOSIS — H52.4 HYPEROPIA WITH ASTIGMATISM AND PRESBYOPIA, BILATERAL: Primary | ICD-10-CM

## 2021-10-14 DIAGNOSIS — Z01.00 ENCOUNTER FOR ROUTINE EYE AND VISION EXAMINATION: ICD-10-CM

## 2021-10-14 DIAGNOSIS — H52.03 HYPEROPIA WITH ASTIGMATISM AND PRESBYOPIA, BILATERAL: Primary | ICD-10-CM

## 2021-10-14 PROBLEM — L25.9 CONTACT DERMATITIS AND OTHER ECZEMA, DUE TO UNSPECIFIED CAUSE: Status: ACTIVE | Noted: 2021-10-14

## 2021-10-14 PROBLEM — E78.5 HYPERLIPIDEMIA: Status: ACTIVE | Noted: 2021-10-14

## 2021-10-14 PROBLEM — N80.9 ENDOMETRIOSIS: Status: ACTIVE | Noted: 2021-10-14

## 2021-10-14 PROBLEM — K59.00 CONSTIPATION: Status: ACTIVE | Noted: 2021-10-14

## 2021-10-14 PROCEDURE — 92004 COMPRE OPH EXAM NEW PT 1/>: CPT | Performed by: OPTOMETRIST

## 2021-10-14 PROCEDURE — 92015 DETERMINE REFRACTIVE STATE: CPT | Performed by: OPTOMETRIST

## 2021-10-14 ASSESSMENT — REFRACTION
OD_SPHERE: +0.75
OS_SPHERE: +1.25
OD_AXIS: 094
OS_AXIS: 097
OS_CYLINDER: +0.25
OD_CYLINDER: +1.50

## 2021-10-14 ASSESSMENT — TONOMETRY
IOP_METHOD: ICARE
OS_IOP_MMHG: 12
OD_IOP_MMHG: 13

## 2021-10-14 ASSESSMENT — VISUAL ACUITY
OS_SC+: -2
OS_SC: 20/20
OD_SC: 20/20
METHOD: SNELLEN - LINEAR
OD_SC+: -3
METHOD_MR_RETINOSCOPY: 1

## 2021-10-14 ASSESSMENT — CONF VISUAL FIELD
OD_NORMAL: 1
METHOD: COUNTING FINGERS
OS_NORMAL: 1

## 2021-10-14 ASSESSMENT — REFRACTION_MANIFEST
OD_CYLINDER: +1.25
OS_CYLINDER: +0.50
OD_SPHERE: -1.00
OS_SPHERE: +0.50
OD_SPHERE: -0.50
OD_AXIS: 088
OS_AXIS: 065
OD_CYLINDER: +1.50
OS_CYLINDER: +0.75
OS_SPHERE: +0.50
OD_AXIS: 100
OS_AXIS: 078

## 2021-10-14 ASSESSMENT — EXTERNAL EXAM - RIGHT EYE: OD_EXAM: NORMAL

## 2021-10-14 ASSESSMENT — CUP TO DISC RATIO
OD_RATIO: 0.30
OS_RATIO: 0.35

## 2021-10-14 ASSESSMENT — SLIT LAMP EXAM - LIDS
COMMENTS: NORMAL
COMMENTS: NORMAL

## 2021-10-14 ASSESSMENT — EXTERNAL EXAM - LEFT EYE: OS_EXAM: NORMAL

## 2021-10-14 NOTE — NURSING NOTE
"Chief Complaints and History of Present Illnesses   Patient presents with     COMPREHENSIVE EYE EXAM     Chief Complaint(s) and History of Present Illness(es)     COMPREHENSIVE EYE EXAM     Laterality: both eyes    Associated symptoms: burning.  Negative for dryness, eye pain, tearing, floaters and flashes    Treatments tried: no treatments    Pain scale: 0/10              Comments     Pt notes that she is having some issues due to computer use, seeing a foggy vision, last had eye exam about 3-4 yrs ago, started using her rx gls which have helped, end of day eyes are tired, vision is blurred upon waking and looking at phone, has some burning.     POHx: no dx, sx, injuries, or lasers  Fhx: denies AMD, Glaucoma, or RD; grandmother on fathers side has a \"yellow spot\" feels it is related to shingles  Mhx: denies DMII, HTN, thyroid issues.     Maria C Stiles COT October 14, 2021 8:51 AM                  "

## 2021-10-14 NOTE — PROGRESS NOTES
A/P  1.) Hyperopia/Astig/Presbyopia each eye  -Some latent hyperopia, symptomatic for near vision in mornings and evenings  -Tried some older Rx glasses which improved but did not resolve symptoms  -Corrects well with updated Rx today. At this time I would recommend Rx reading glasses only as she is asymptomatic at distance and only notices fatigue when the eyes are tired  -Dilated ocular health unremarkable each eye    Monitor 1-2 years routine, sooner prn    I have confirmed the patient's CC, HPI and reviewed Past Medical History, Past Surgical History, Social History, Family History, Problem List, Medication List and agree with Tech note.     Geneva Hernandez, OD [facility]

## 2021-10-24 NOTE — PROGRESS NOTES
"Ophelia \"Gretel\" Ramez Vincent is a 42 yo woman with hx of migraine, constipation, hyperlipidemia, varicose veins, contact dermatitis, anxiety, endometriosis, and a family hx of ischemic heart disease.     She is here for a preventive exam.  She is not fasting. She is up to date on eye exams and dental visits. Wears seat belt-yes. Bike helmet- yes.       HCM  Advanced Directive: none on file, information given   COVID vaccine series: 2/3 Moderna, symptoms after second vaccine, discussed boosters and myocarditis data with regard to her son  Other vaccinations Flu vaccine due today, declines, has only gotten flu vaccine while pregnant  All other vaccines UTD  Pap done 5/3/16 with [facility]BGYN [facility], continues to follow with them  Reports completing a mammogram 1-2 weeks ago in [facility], found some benign cysts in the left breast  Hep C screening due    Diet: used to be vegan/vegetarian, now eating chicken. Very little beef or pork.  Wt Readings from Last 4 Encounters:   10/28/21 59 kg (130 lb 0.6 oz)   02/25/20 58.2 kg (128 lb 4 oz)   09/19/19 57 kg (125 lb 12 oz)   09/28/18 59.4 kg (131 lb)     Body mass index is 21.82 kg/m .    Hx of low iron  Gretel has a hx of low iron. She is continuing to have some hair thinning/loss and would like labs tested again today. Restarted an iron supplement about a month ago.  Gretel is also taking vitamin E supplement. Not using topical products like Rogaine, has some shampoos that she thinks may be helping. Her menstrual periods are monthly and typically 5 days with 2 of heavier bleeding, but she has had some random \"super heavy\" periods.     Family hx of ischemic heart disease  Gretel has a family hx of heart disease in her PGF, father, and two paternal uncles. She has seen preventive cardiology at [facility] in the past, who told her that she shares a protein that her dad had.  Gretel reports some irregular, random palpitations. She sometimes feels a \"pinch\" in her chest. These " episodes are not accompanied by shortness of breath or exercise intolerance. Nonsmoker. No personal hx of hypertension.     Atypical mole   Gretel is concerned about a mole under her right armpit that she wanted removed. We sent a referral to dermatology at that time. Today, Gretel reports that she has an appointment in about two weeks with dermatology through the [facility]. She has no personal or family hx of skin cancers, uses sunscreen.       Health Maintenance   Topic Date Due     ADVANCE CARE PLANNING  Never done     HEPATITIS C SCREENING  Never done     PREVENTIVE CARE VISIT  2020     INFLUENZA VACCINE (1) 2021     PAP  2022     DTAP/TDAP/TD IMMUNIZATION (3 - Td or Tdap) 2024     HIV SCREENING  Completed     PHQ-2  Completed     COVID-19 Vaccine  Completed     Pneumococcal Vaccine: Pediatrics (0 to 5 Years) and At-Risk Patients (6 to 64 Years)  Aged Out     IPV IMMUNIZATION  Aged Out     MENINGITIS IMMUNIZATION  Aged Out     HEPATITIS B IMMUNIZATION  Aged Out       Patient Active Problem List   Diagnosis     Migraine     Unicornate uterus     Migraine without aura and without status migrainosus, not intractable     Anxiety state     Constipation     Contact dermatitis and other eczema, due to unspecified cause     Endometriosis     Family history of ischemic heart disease     High-risk pregnancy, elderly primigravida     Hyperlipidemia      labor      premature rupture of membranes (PPROM) delivered, current hospitalization     Unicornuate uterus affecting pregnancy, antepartum     Varicose veins of legs     Vitamin D deficiency       Past Surgical History:   Procedure Laterality Date     HYSTEROSCOPY  2019       Family History   Problem Relation Age of Onset     Hypertension Mother      Hyperlipidemia Mother      Arrhythmia Mother         palpitations     Heart Disease Father 51        MI with stent placement-52yo, CABG x 4 61yo     High cholesterol Father       "Arrhythmia Father         ICD placed after open heart surgery     Osteoporosis Maternal Grandmother      Cerebrovascular Disease Paternal Grandmother 75     Hypertension Paternal Grandmother      Cancer Maternal Grandfather         bone marrow, leukemia     Diabetes Maternal Grandfather      Hypertension Maternal Grandfather      Alcoholism Maternal Grandfather      Heart Disease Paternal Grandfather 50        MI,      Heart Disease Paternal Uncle 50        x 2 brother     Migraines Sister      Glaucoma No family hx of      Macular Degeneration No family hx of        Social:     1 son, 8 yo   Clinical psychologist, 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 Family hx of osteoporosis  Caffeine: 2/day  Exercise: Regular exercise, less frequent since school started for her son      OB/GYN HISTORY:   LMP: Patient's last menstrual period was 10/20/2021.  Cycle: Regular, 28 days. 5 days of bleeding with the first 2 being heavy, some passage of clots.   Has had a few random \"super heavy\" periods.   Hx abnormal pap? None.  STD hx? no  dysmenorrhea/PMS: yes, 1 wk prior to menses, migraine, uses Excederin  Vasomotor sx: no  Contraception: none, open to pregnancy   G 1 P 1 A 0  Self Breast exam: yes    Current Outpatient Medications   Medication Sig Dispense Refill     Cholecalciferol (VITAMIN D PO) Take 1 tablet by mouth daily       COMPRESSION STOCKINGS Knee high compression stockings   20-30mmHg such as TEDS hose 2 each 0     Prenatal MV-Min-Fe Fum-FA-DHA (PRENATAL 1 PO) Take 1 tablet by mouth daily       Allergies   Allergen Reactions     Penicillins          ROS  CONSTITUTIONAL:NEGATIVE for fever, chills, change in weight  INTEGUMENTARY/SKIN: NEGATIVE for worrisome rashes or lesions, Mole under right armpit  EYES: NEGATIVE for vision changes or irritation  ENT/MOUTH: NEGATIVE for ear, mouth and throat " "problems  RESP:NEGATIVE for significant cough or SOB  BREAST: NEGATIVE for masses, tenderness or discharge  CV: NEGATIVE for chest pain, palpitations, RICKETTS, orthopnea, PND  or peripheral edema  GI: NEGATIVE for nausea, abdominal pain, heartburn, or change in bowel habits  :NEGATIVE for frequency, dysuria, or hematuria  MUSCULOSKELETAL:NEGATIVE for significant arthralgias or myalgia  NEURO: NEGATIVE for weakness, dizziness or paresthesias  ENDOCRINE: NEGATIVE for polyuria/dipsia, temperature intolerance, skin/hair changes  HEME/ALLERGY/IMMUNE: NEGATIVE for bleeding problems  PSYCHIATRIC: NEGATIVE for changes in mood or affect    EXAM  /78   Pulse 71   Temp 97  F (36.1  C)   Ht 1.644 m (5' 4.72\")   Wt 59 kg (130 lb 0.6 oz)   LMP 10/20/2021   SpO2 97%   Breastfeeding No   BMI 21.82 kg/m      GENERAL APPEARANCE: Alert, pleasant, NAD  EYES: PERRL, EOMI, conjunctiva clear  HENT: TM normal bilaterally. Nose and mouth masked  NECK: no adenopathy, thyroid normal to palpation  RESP: lungs clear to auscultation bilaterally  BREAST: done at [facility]  CV: regular rate and rhythm, normal S1 S2, no murmur, no carotid bruits  ABDOMEN: soft, nontender, without HSM or masses. Bowel sounds normal  MS: extremities normal- no gross deformities noted, no tender, hot or swollen joints.   SKIN: no suspicious lesions or rashes, Single well-circumscribed, brown, raised papule at the right posterior axilla  NEURO: Normal strength and tone, sensory exam grossly normal, DTR normoreflexive in upper and lower extremities  PSYCH: mentation appears normal. and affect normal/bright.  EXT: no peripheral edema, pedal pulses palpable    Assessment:  (Z00.00) Annual physical exam  (primary encounter diagnosis)  Comment: 41 year old female in good health  Plan: CBC with Platelets, Lipid Profile,         Comprehensive metabolic panel        Today we discussed ways to maintain a healthy lifestyle with sensible eating, regular exercise and self " cares. We dicussed calcium and Vitamin D intake, vaccinations and preventive health screens.  Healthcare directive information given. Declines flu vaccine.     (Z11.59) Need for hepatitis C screening test  Comment: routine screening  Plan: Hepatitis C Screen Reflex to HCV RNA Quant and         Genotype    (L65.9) Hair thinning  Comment: hx of low iron, restarted using iron drops one month ago but tries not to use these for too long. Has shampoos that she thinks may be helpful  Plan: CBC with Platelets, Ferritin, Iron and iron         binding capacity, TSH with free T4 reflex        Lab results from [facility]/OBGYN reviewed. Labs pending. Gretel will send me the dosing information for her iron drops via [employer]. Recommended asking dermatologist about hair thinning at upcoming appointment.   Addendum: liquid iron 10mg dose per 10ml.   Could increase dose of iron to 20mg CBC and Iron levels look OK. Ferritin a bit low at 22.    Mildred Becerra MD  Internal Medicine/Pediatrics      I, Viridiana Romo, am serving as a scribe to document services personally performed by Dr. Mildred Becerra, based on data collection and the provider's statements to me. Dr. Becerra has reviewed, edited, and approved the above note.

## 2021-10-28 ENCOUNTER — OFFICE VISIT (OUTPATIENT)
Dept: FAMILY MEDICINE | Facility: CLINIC | Age: 41
End: 2021-10-28
Payer: COMMERCIAL

## 2021-10-28 VITALS
HEIGHT: 65 IN | WEIGHT: 130.04 LBS | TEMPERATURE: 97 F | OXYGEN SATURATION: 97 % | SYSTOLIC BLOOD PRESSURE: 112 MMHG | BODY MASS INDEX: 21.67 KG/M2 | DIASTOLIC BLOOD PRESSURE: 78 MMHG | HEART RATE: 71 BPM

## 2021-10-28 DIAGNOSIS — L65.9 HAIR THINNING: ICD-10-CM

## 2021-10-28 DIAGNOSIS — Z00.00 ANNUAL PHYSICAL EXAM: Primary | ICD-10-CM

## 2021-10-28 DIAGNOSIS — Z11.59 NEED FOR HEPATITIS C SCREENING TEST: ICD-10-CM

## 2021-10-28 LAB
ALBUMIN SERPL-MCNC: 3.6 G/DL (ref 3.4–5)
ALP SERPL-CCNC: 47 U/L (ref 40–150)
ALT SERPL W P-5'-P-CCNC: 26 U/L (ref 0–50)
ANION GAP SERPL CALCULATED.3IONS-SCNC: 5 MMOL/L (ref 3–14)
AST SERPL W P-5'-P-CCNC: 23 U/L (ref 0–45)
BILIRUB SERPL-MCNC: 0.2 MG/DL (ref 0.2–1.3)
BUN SERPL-MCNC: 15 MG/DL (ref 7–30)
CALCIUM SERPL-MCNC: 8.8 MG/DL (ref 8.5–10.1)
CHLORIDE BLD-SCNC: 106 MMOL/L (ref 94–109)
CHOLEST SERPL-MCNC: 237 MG/DL
CO2 SERPL-SCNC: 27 MMOL/L (ref 20–32)
CREAT SERPL-MCNC: 0.77 MG/DL (ref 0.52–1.04)
ERYTHROCYTE [DISTWIDTH] IN BLOOD BY AUTOMATED COUNT: 11.8 % (ref 10–15)
FASTING STATUS PATIENT QL REPORTED: NO
FERRITIN SERPL-MCNC: 22 NG/ML (ref 12–150)
GFR SERPL CREATININE-BSD FRML MDRD: >90 ML/MIN/1.73M2
GLUCOSE BLD-MCNC: 84 MG/DL (ref 70–99)
HCT VFR BLD AUTO: 39.4 % (ref 35–47)
HDLC SERPL-MCNC: 66 MG/DL
HGB BLD-MCNC: 13.1 G/DL (ref 11.7–15.7)
IRON SATN MFR SERPL: 41 % (ref 15–46)
IRON SERPL-MCNC: 113 UG/DL (ref 35–180)
LDLC SERPL CALC-MCNC: 160 MG/DL
MCH RBC QN AUTO: 30.8 PG (ref 26.5–33)
MCHC RBC AUTO-ENTMCNC: 33.2 G/DL (ref 31.5–36.5)
MCV RBC AUTO: 93 FL (ref 78–100)
NONHDLC SERPL-MCNC: 171 MG/DL
PLATELET # BLD AUTO: 288 10E3/UL (ref 150–450)
POTASSIUM BLD-SCNC: 4.1 MMOL/L (ref 3.4–5.3)
PROT SERPL-MCNC: 7.5 G/DL (ref 6.8–8.8)
RBC # BLD AUTO: 4.25 10E6/UL (ref 3.8–5.2)
SODIUM SERPL-SCNC: 138 MMOL/L (ref 133–144)
TIBC SERPL-MCNC: 279 UG/DL (ref 240–430)
TRIGL SERPL-MCNC: 53 MG/DL
TSH SERPL DL<=0.005 MIU/L-ACNC: 1.04 MU/L (ref 0.4–4)
WBC # BLD AUTO: 7.7 10E3/UL (ref 4–11)

## 2021-10-28 PROCEDURE — 82728 ASSAY OF FERRITIN: CPT | Performed by: INTERNAL MEDICINE

## 2021-10-28 PROCEDURE — 85027 COMPLETE CBC AUTOMATED: CPT | Performed by: INTERNAL MEDICINE

## 2021-10-28 PROCEDURE — 80061 LIPID PANEL: CPT | Performed by: INTERNAL MEDICINE

## 2021-10-28 PROCEDURE — 80053 COMPREHEN METABOLIC PANEL: CPT | Performed by: INTERNAL MEDICINE

## 2021-10-28 PROCEDURE — 84443 ASSAY THYROID STIM HORMONE: CPT | Performed by: INTERNAL MEDICINE

## 2021-10-28 PROCEDURE — 83550 IRON BINDING TEST: CPT | Performed by: INTERNAL MEDICINE

## 2021-10-28 PROCEDURE — 86803 HEPATITIS C AB TEST: CPT | Performed by: INTERNAL MEDICINE

## 2021-10-28 ASSESSMENT — MIFFLIN-ST. JEOR: SCORE: 1241.35

## 2021-10-29 LAB — HCV AB SERPL QL IA: NONREACTIVE

## 2021-11-06 RX ORDER — QUINIDINE GLUCONATE 324 MG
TABLET, EXTENDED RELEASE ORAL
Qty: 1 TABLET | Refills: 1 | COMMUNITY
Start: 2021-11-06

## 2021-11-12 ENCOUNTER — IMMUNIZATION (OUTPATIENT)
Dept: NURSING | Facility: CLINIC | Age: 41
End: 2021-11-12
Payer: COMMERCIAL

## 2021-11-12 PROCEDURE — 91306 COVID-19,PF,MODERNA (18+ YRS BOOSTER .25ML): CPT

## 2021-11-12 PROCEDURE — 0064A COVID-19,PF,MODERNA (18+ YRS BOOSTER .25ML): CPT

## 2022-02-10 ENCOUNTER — OFFICE VISIT (OUTPATIENT)
Dept: DERMATOLOGY | Facility: CLINIC | Age: 42
End: 2022-02-10
Payer: COMMERCIAL

## 2022-02-10 DIAGNOSIS — L82.0 SEBORRHEIC KERATOSES, INFLAMED: ICD-10-CM

## 2022-02-10 DIAGNOSIS — L70.0 ACNE VULGARIS: Primary | ICD-10-CM

## 2022-02-10 DIAGNOSIS — D48.9 NEOPLASM OF UNCERTAIN BEHAVIOR: ICD-10-CM

## 2022-02-10 PROCEDURE — 17110 DESTRUCTION B9 LES UP TO 14: CPT | Performed by: DERMATOLOGY

## 2022-02-10 PROCEDURE — 11102 TANGNTL BX SKIN SINGLE LES: CPT | Mod: XS | Performed by: DERMATOLOGY

## 2022-02-10 PROCEDURE — 99213 OFFICE O/P EST LOW 20 MIN: CPT | Mod: 25 | Performed by: DERMATOLOGY

## 2022-02-10 PROCEDURE — 88305 TISSUE EXAM BY PATHOLOGIST: CPT | Mod: TC | Performed by: DERMATOLOGY

## 2022-02-10 RX ORDER — MULTIPLE VITAMINS W/ MINERALS TAB 9MG-400MCG
1 TAB ORAL DAILY
COMMUNITY
End: 2022-12-23

## 2022-02-10 RX ORDER — VITAMIN E 268 MG
CAPSULE ORAL DAILY
COMMUNITY

## 2022-02-10 RX ORDER — ZINC SULFATE 50(220)MG
220 CAPSULE ORAL DAILY
COMMUNITY

## 2022-02-10 RX ORDER — TRETINOIN 0.25 MG/G
CREAM TOPICAL
Qty: 45 G | Refills: 3 | Status: SHIPPED | OUTPATIENT
Start: 2022-02-10 | End: 2022-12-23

## 2022-02-10 ASSESSMENT — PAIN SCALES - GENERAL: PAINLEVEL: NO PAIN (0)

## 2022-02-10 NOTE — PROGRESS NOTES
[facility] Dermatology Note  Encounter Date: Feb 10, 2022  Office Visit     Dermatology Problem List:  1. Multiple benign nevi  - Shave biopsy of nevus on R axilla 2/10. Minimal concerns for malignancy, but will follow up with results  2. SKs.   - Cryotherapy of SK on R axilla/upper chest  3. Cherry angiomas  4. Sebaceous hyperplasia  - Tretinoin 0.025% ointment prescribed 2/10  5. Thinning hair  - Continue iron supplementation     ____________________________________________    Assessment & Plan:    1. Multiple benign nevi  - Reassurance as to benign appearance on today's exam    2. Irritated nevus of R axilla, bothersome to patient.  - Shave biopsy of nevus on R axilla 2/10. Minimal concerns for malignancy, but will follow up with results    3. SKs, irritated, x2.   - Cryotherapy of SK on R axilla/upper chest    3. Cherry angiomas - reassurance    4. Sebaceous hyperplasia  - Tretinoin 0.025% ointment prescribed 2/10/22  - Reviewed proper use and potential side effects    5. Thinning hair  - Continue iron supplementation    Procedures Performed:   - Shave biopsy procedure note, location(s): R axilla. After discussion of benefits and risks including but not limited to bleeding, infection, scar, incomplete removal, recurrence, and non-diagnostic biopsy, written consent and photographs were obtained. The area was cleaned with isopropyl alcohol. 0.5mL of 1% lidocaine with epinephrine was injected to obtain adequate anesthesia of lesion(s). Shave biopsy at site(s) performed. Hemostasis was achieved with aluminium chloride. Petrolatum ointment and a sterile dressing were applied. The patient tolerated the procedure and no complications were noted. The patient was provided with verbal and written post care instructions.   - - Cryotherapy procedure note, location(s): R upper chest. After verbal consent and discussion of risks and benefits including, but not limited to, dyspigmentation/scar, blister, and  pain, 1 lesion(s) was(were) treated with 1-2 mm freeze border for 1-2 cycles with liquid nitrogen. Post cryotherapy instructions were provided.  - Procedure(s) performed by faculty.     Follow-up: 1-2 year(s) in-person, or earlier for new or changing lesions    Staff and Medical Student:     I, Mao Lim MS4, saw this patient with staff, Dr. Strong    I was present with the medical student who participated in the service and in the documentation.  I have verified the history and personally performed the physical exam and medical decision making.  I agree with the assessment and plan of care as documented in the note.  I personally performed all procedures.    Reji Strong MD  Dermatology Attending    ____________________________________________    CC: Derm Problem (Gretel is here for areas of concern on the right arm - patient is requesting removal, and on the thigh. Reports increasing sensitive skin. )    HPI:  Ms. Ophelia Vincent is a(n) 41 year old female who presents today as a return patient for skin check. She was last seen in 10/2019 for skin check which was notable for benign nevi and SKs. Told to return in 2 years for follow up. Denies a personal and family history of skin cancer. The patient denies painful, itching, tingling or bleeding lesions.   - No major changes since this last visit, but noting that the mole in her R axilla is bothersome. Not painful, no changes in color/size, just feels like it is annoying and interferes with her shaving.   - Also notes skin tag on R side of chest which is also unchanged but annoying and would be interested in removing.  - Has one mole on L thigh that she wants checked, but has not noted any changes since last visit.   - Also wanted to discuss her ferritin levels and the downtrend that she's noticing in this lab value (most recent 22 as of 10/2021), especially in the context of some thinning/shedding of hair. Currently using a Vitamin E supplement and  "multivitamin with ferritin.   - Also had questions about cosmetic acupuncture (UL therapy?).   - Has some questions about facial acne/noting some \"calcifications\" on her face. Notes history of sensitivity to sunscreen with acne/dermatitis after application.      Patient is otherwise feeling well, without additional skin concerns.    Labs:  Iron studies  reviewed.    Physical Exam:  Vitals: There were no vitals taken for this visit.  SKIN: Focused examination of face, R axilla and upper chest, L thigh was performed.  - ~7mm diameter brown papule noted on R axilla with regular borders, no irregular coloration c/w benign nevus  - Waxy stuck on tan papule on R upper chest c/w SK  - Benign nevus noted on L thigh  - Scattered areas of sebaceous hyperplasia noted on cheeks and along jawline  - No other lesions of concern on areas examined.     Medications:  Current Outpatient Medications   Medication     Cholecalciferol (VITAMIN D PO)     COMPRESSION STOCKINGS     multivitamin w/minerals (THERA-VIT-M) tablet     vitamin E (TOCOPHEROL) 400 units (180 mg) capsule     zinc sulfate (ZINCATE) 220 (50 Zn) MG capsule     Ferrous Gluconate 240 (27 Fe) MG TABS     No current facility-administered medications for this visit.      Past Medical History:   Patient Active Problem List   Diagnosis     Migraine     Unicornate uterus     Migraine without aura and without status migrainosus, not intractable     Anxiety state     Constipation     Contact dermatitis and other eczema, due to unspecified cause     Endometriosis     Family history of ischemic heart disease     High-risk pregnancy, elderly primigravida     Hyperlipidemia      labor      premature rupture of membranes (PPROM) delivered, current hospitalization     Unicornuate uterus affecting pregnancy, antepartum     Varicose veins of legs     Vitamin D deficiency     Past Medical History:   Diagnosis Date     Family history of ischemic heart disease      " Hyperlipidemia      Migraine     associated with mentral cycle     Unicornate uterus     ovulates every other month     Vitamin D deficiency         CC Referred Self, MD  No address on file on close of this encounter.

## 2022-02-10 NOTE — LETTER
2/10/2022       RE: Ophelia Vincent  [address]  [facility] [address]     Dear Colleague,    Thank you for referring your patient, Ophelia Vincent, to the [facility] [place] at [facility]. Please see a copy of my visit note below.    [facility] Dermatology Note  Encounter Date: Feb 10, 2022  Office Visit     Dermatology Problem List:  1. Multiple benign nevi  - Shave biopsy of nevus on R axilla 2/10. Minimal concerns for malignancy, but will follow up with results  2. SKs.   - Cryotherapy of SK on R axilla/upper chest  3. Cherry angiomas  4. Sebaceous hyperplasia  - Tretinoin 0.025% ointment prescribed 2/10  5. Thinning hair  - Continue iron supplementation     ____________________________________________    Assessment & Plan:    1. Multiple benign nevi  - Reassurance as to benign appearance on today's exam    2. Irritated nevus of R axilla, bothersome to patient.  - Shave biopsy of nevus on R axilla 2/10. Minimal concerns for malignancy, but will follow up with results    3. SKs, irritated, x2.   - Cryotherapy of SK on R axilla/upper chest    3. Cherry angiomas - reassurance    4. Sebaceous hyperplasia  - Tretinoin 0.025% ointment prescribed 2/10/22  - Reviewed proper use and potential side effects    5. Thinning hair  - Continue iron supplementation    Procedures Performed:   - Shave biopsy procedure note, location(s): R axilla. After discussion of benefits and risks including but not limited to bleeding, infection, scar, incomplete removal, recurrence, and non-diagnostic biopsy, written consent and photographs were obtained. The area was cleaned with isopropyl alcohol. 0.5mL of 1% lidocaine with epinephrine was injected to obtain adequate anesthesia of lesion(s). Shave biopsy at site(s) performed. Hemostasis was achieved with aluminium chloride. Petrolatum ointment and a sterile dressing  were applied. The patient tolerated the procedure and no complications were noted. The patient was provided with verbal and written post care instructions.   - - Cryotherapy procedure note, location(s): R upper chest. After verbal consent and discussion of risks and benefits including, but not limited to, dyspigmentation/scar, blister, and pain, 1 lesion(s) was(were) treated with 1-2 mm freeze border for 1-2 cycles with liquid nitrogen. Post cryotherapy instructions were provided.  - Procedure(s) performed by faculty.     Follow-up: 1-2 year(s) in-person, or earlier for new or changing lesions    Staff and Medical Student:     I, Mao Lim MS4, saw this patient with staff, Dr. Strong    I was present with the medical student who participated in the service and in the documentation.  I have verified the history and personally performed the physical exam and medical decision making.  I agree with the assessment and plan of care as documented in the note.  I personally performed all procedures.    Reji Strong MD  Dermatology Attending    ____________________________________________    CC: Derm Problem (Gretel is here for areas of concern on the right arm - patient is requesting removal, and on the thigh. Reports increasing sensitive skin. )    HPI:  Ms. Ophelia Vincent is a(n) 41 year old female who presents today as a return patient for skin check. She was last seen in 10/2019 for skin check which was notable for benign nevi and SKs. Told to return in 2 years for follow up. Denies a personal and family history of skin cancer. The patient denies painful, itching, tingling or bleeding lesions.   - No major changes since this last visit, but noting that the mole in her R axilla is bothersome. Not painful, no changes in color/size, just feels like it is annoying and interferes with her shaving.   - Also notes skin tag on R side of chest which is also unchanged but annoying and would be interested in  "removing.  - Has one mole on L thigh that she wants checked, but has not noted any changes since last visit.   - Also wanted to discuss her ferritin levels and the downtrend that she's noticing in this lab value (most recent 22 as of 10/2021), especially in the context of some thinning/shedding of hair. Currently using a Vitamin E supplement and multivitamin with ferritin.   - Also had questions about cosmetic acupuncture (UL therapy?).   - Has some questions about facial acne/noting some \"calcifications\" on her face. Notes history of sensitivity to sunscreen with acne/dermatitis after application.      Patient is otherwise feeling well, without additional skin concerns.    Labs:  Iron studies  reviewed.    Physical Exam:  Vitals: There were no vitals taken for this visit.  SKIN: Focused examination of face, R axilla and upper chest, L thigh was performed.  - ~7mm diameter brown papule noted on R axilla with regular borders, no irregular coloration c/w benign nevus  - Waxy stuck on tan papule on R upper chest c/w SK  - Benign nevus noted on L thigh  - Scattered areas of sebaceous hyperplasia noted on cheeks and along jawline  - No other lesions of concern on areas examined.     Medications:  Current Outpatient Medications   Medication     Cholecalciferol (VITAMIN D PO)     COMPRESSION STOCKINGS     multivitamin w/minerals (THERA-VIT-M) tablet     vitamin E (TOCOPHEROL) 400 units (180 mg) capsule     zinc sulfate (ZINCATE) 220 (50 Zn) MG capsule     Ferrous Gluconate 240 (27 Fe) MG TABS     No current facility-administered medications for this visit.      Past Medical History:   Patient Active Problem List   Diagnosis     Migraine     Unicornate uterus     Migraine without aura and without status migrainosus, not intractable     Anxiety state     Constipation     Contact dermatitis and other eczema, due to unspecified cause     Endometriosis     Family history of ischemic heart disease     High-risk pregnancy, " elderly primigravida     Hyperlipidemia      labor      premature rupture of membranes (PPROM) delivered, current hospitalization     Unicornuate uterus affecting pregnancy, antepartum     Varicose veins of legs     Vitamin D deficiency     Past Medical History:   Diagnosis Date     Family history of ischemic heart disease      Hyperlipidemia      Migraine     associated with mentral cycle     Unicornate uterus     ovulates every other month     Vitamin D deficiency         CC Referred Self, MD  No address on file on close of this encounter.

## 2022-02-10 NOTE — NURSING NOTE
Dermatology Rooming Note    Ophelia Vincent's goals for this visit include:   Chief Complaint   Patient presents with     Derm Problem     Gretel is here for areas of concern on the right arm - patient is requesting removal, and on the thigh. Reports increasing sensitive skin.      Laura Wasserman, EMT

## 2022-02-10 NOTE — PATIENT INSTRUCTIONS

## 2022-02-14 LAB
PATH REPORT.COMMENTS IMP SPEC: NORMAL
PATH REPORT.COMMENTS IMP SPEC: NORMAL
PATH REPORT.FINAL DX SPEC: NORMAL
PATH REPORT.GROSS SPEC: NORMAL
PATH REPORT.MICROSCOPIC SPEC OTHER STN: NORMAL
PATH REPORT.RELEVANT HX SPEC: NORMAL

## 2022-03-06 PROBLEM — L70.0 ACNE VULGARIS: Status: ACTIVE | Noted: 2022-03-06

## 2022-03-06 PROBLEM — D48.9 NEOPLASM OF UNCERTAIN BEHAVIOR: Status: ACTIVE | Noted: 2022-03-06

## 2022-03-06 PROBLEM — L82.0 SEBORRHEIC KERATOSES, INFLAMED: Status: ACTIVE | Noted: 2022-03-06

## 2022-08-20 ENCOUNTER — PATIENT OUTREACH (OUTPATIENT)
Dept: DERMATOLOGY | Facility: CLINIC | Age: 42
End: 2022-08-20

## 2022-08-20 NOTE — TELEPHONE ENCOUNTER
Attempted to reach patient to schedule follow up in the Dermatology Clinic.  No answer,  LM on VM to call office and [facility] message sent..    Schedule with Dr. Reji Strong 2/2023.

## 2022-09-08 LAB — PAP SMEAR - HIM PATIENT REPORTED: NEGATIVE

## 2022-09-11 ENCOUNTER — HEALTH MAINTENANCE LETTER (OUTPATIENT)
Age: 42
End: 2022-09-11

## 2022-11-02 NOTE — PATIENT INSTRUCTIONS
Iron 65 mg elemental iron daily with vitamin C    1200mg calcium daily  Vitamin D 1000 international unit(s) daily    Dermatology  [facility]  [address]  [place] [address] [phone]    Dermatology consultants, [facility] in [place]  CRISTOBAL RODRIGUEZ MD        Preventive Health Recommendations  Female Ages 40 to 49    Yearly exam:   See your health care provider every year in order to  Review health changes.   Discuss preventive care.    Review your medicines if your doctor prescribed any.    Get a Pap test every three years (unless you have an abnormal result and your provider advises testing more often).    If you get Pap tests with HPV test, you only need to test every 5 years, unless you have an abnormal result. You do not need a Pap test if your uterus was removed (hysterectomy) and you have not had cancer.    You should be tested each year for STDs (sexually transmitted diseases), if you're at risk.   Ask your doctor if you should have a mammogram.    Have a colonoscopy (test for colon cancer) if someone in your family has had colon cancer or polyps before age 50.     Have a cholesterol test every 5 years.     Have a diabetes test (fasting glucose) after age 45. If you are at risk for diabetes, you should have this test every 3 years.    Shots: Get a flu shot each year. Get a tetanus shot every 10 years.     Nutrition:   Eat at least 5 servings of fruits and vegetables each day.  Eat whole-grain bread, whole-wheat pasta and brown rice instead of white grains and rice.  Get adequate Calcium and Vitamin D.      Lifestyle  Exercise at least 150 minutes a week (an average of 30 minutes a day, 5 days a week). This will help you control your weight and prevent disease.  Limit alcohol to one drink per day.  No smoking.   Wear sunscreen to prevent skin cancer.  See your dentist every six months for an exam and cleaning.

## 2022-11-03 ENCOUNTER — OFFICE VISIT (OUTPATIENT)
Dept: FAMILY MEDICINE | Facility: CLINIC | Age: 42
End: 2022-11-03
Payer: COMMERCIAL

## 2022-11-03 VITALS
HEART RATE: 68 BPM | DIASTOLIC BLOOD PRESSURE: 81 MMHG | HEIGHT: 65 IN | RESPIRATION RATE: 16 BRPM | OXYGEN SATURATION: 99 % | BODY MASS INDEX: 22.16 KG/M2 | WEIGHT: 133 LBS | SYSTOLIC BLOOD PRESSURE: 121 MMHG | TEMPERATURE: 98.1 F

## 2022-11-03 DIAGNOSIS — Z82.49 FAMILY HISTORY OF CORONARY ARTERY DISEASE: ICD-10-CM

## 2022-11-03 DIAGNOSIS — Z00.00 ROUTINE GENERAL MEDICAL EXAMINATION AT A HEALTH CARE FACILITY: Primary | ICD-10-CM

## 2022-11-03 DIAGNOSIS — N92.0 MENORRHAGIA WITH REGULAR CYCLE: ICD-10-CM

## 2022-11-03 DIAGNOSIS — E78.49 OTHER HYPERLIPIDEMIA: ICD-10-CM

## 2022-11-03 LAB
CHOLEST SERPL-MCNC: 238 MG/DL
HDLC SERPL-MCNC: 63 MG/DL
LDLC SERPL CALC-MCNC: 161 MG/DL
NONHDLC SERPL-MCNC: 175 MG/DL
TRIGL SERPL-MCNC: 71 MG/DL

## 2022-11-03 PROCEDURE — 80061 LIPID PANEL: CPT | Performed by: INTERNAL MEDICINE

## 2022-11-03 ASSESSMENT — ENCOUNTER SYMPTOMS
SHORTNESS OF BREATH: 0
NERVOUS/ANXIOUS: 0
DIZZINESS: 0
CONSTIPATION: 0
SORE THROAT: 0
DYSURIA: 0
DIARRHEA: 0
HEARTBURN: 0
HEMATURIA: 0
FEVER: 0
MYALGIAS: 0
FREQUENCY: 0
WEAKNESS: 0
CHILLS: 0
ARTHRALGIAS: 1
JOINT SWELLING: 0
ABDOMINAL PAIN: 0
HEMATOCHEZIA: 0
NAUSEA: 0
EYE PAIN: 0
PARESTHESIAS: 0
BREAST MASS: 0
PALPITATIONS: 1
COUGH: 0
HEADACHES: 0

## 2022-11-03 ASSESSMENT — PAIN SCALES - GENERAL: PAINLEVEL: NO PAIN (0)

## 2022-11-03 NOTE — PROGRESS NOTES
"Ophelia \"Gretel\" Ramez Vincent is a 41 yo woman with hx of migraine, constipation, hyperlipidemia, varicose veins, contact dermatitis, anxiety, endometriosis, and a family hx of ischemic heart disease. She is here for a preventive exam.  She is not fasting. She is up to date on eye exams and dental visits.      HCM  Advanced Directive: none on file  COVID vaccine series: Eligible for COVID bivalent booster, had Covid at the end of May  Other vaccinations flu and Hep B vaccines due, patient declines both vaccinations at this time  Pap just completed 9/8/2022, normal ([facility], Dr. Eileen Singh)  Mammogram: completed at [facility] 10/27/2022  Reports completing a mammogram in 10/2021, found some benign cysts in the left breast    Diet: Balanced, fish  Wt Readings from Last 4 Encounters:   11/03/22 60.3 kg (133 lb)   10/28/21 59 kg (130 lb 0.6 oz)   02/25/20 58.2 kg (128 lb 4 oz)   09/19/19 57 kg (125 lb 12 oz)     Body mass index is 22.43 kg/m .    Low Iron/ Hair thinning  Gretel reports a very regular cycle every 28 days. She describes them as \"not super heavy,\" but states that every 2-3 cycles, she will have one extremely heavy day. She reports that her recent ferritin level with ob/gyn was 33 or so, which is higher but not in her goal range. She does note that she previously saw dermatology for hair thinning and was prescribed liquid iron drops, which was helpful for her. She does note that she would like to see a different dermatologist in the future. She has since tried OTC iron supplements without much improvement. She continues to take these supplements occasionally. She is aware of the recommendation to take iron supplements with acidic food/drink. Discussed topical vs systemic minoxidil.     Chest Discomfort/Family history of early heart disease  Gretel reports that she gets left-sided chest discomfort just below her left breast both at night when going to sleep and during the day. No discomfort with exertion. Also notes " brief palpitations, not associated with dizziness. Family hx significant for early heart disease in her father and PGF/ paternal uncles.  She has hyperlipidemia. Concerned about side effect of statin drugs.    Recent Labs   Lab Test 10/28/21  1344 19  0838 18  0908 05/15/18  0840   CHOL 237* 208.0*   < > 213.0*   HDL 66 58.0   < > 59.0   * 135.0*   < > 140.0*   TRIG 53 70.0   < > 69.0   CHOLHDLRATIO  --  3.6  --  3.6    < > = values in this interval not displayed.       Health Maintenance   Topic Date Due     ADVANCE CARE PLANNING  Never done     HEPATITIS B IMMUNIZATION (1 of 3 - 3-dose series) Never done     PAP  Never done     COVID-19 Vaccine (4 - Booster for Moderna series) 2022     INFLUENZA VACCINE (1) 2022     YEARLY PREVENTIVE VISIT  2023     DTAP/TDAP/TD IMMUNIZATION (3 - Td or Tdap) 2024     HEPATITIS C SCREENING  Completed     HIV SCREENING  Completed     PHQ-2 (once per calendar year)  Completed     Pneumococcal Vaccine: Pediatrics (0 to 5 Years) and At-Risk Patients (6 to 64 Years)  Aged Out     IPV IMMUNIZATION  Aged Out     MENINGITIS IMMUNIZATION  Aged Out         Patient Active Problem List   Diagnosis     Migraine     Unicornate uterus     Migraine without aura and without status migrainosus, not intractable     Anxiety state     Constipation     Contact dermatitis and other eczema, due to unspecified cause     Endometriosis     Family history of ischemic heart disease     High-risk pregnancy, elderly primigravida     Hyperlipidemia      labor      premature rupture of membranes (PPROM) delivered, current hospitalization     Unicornuate uterus affecting pregnancy, antepartum     Varicose veins of legs     Vitamin D deficiency     Neoplasm of uncertain behavior     Acne vulgaris     Seborrheic keratoses, inflamed       Past Surgical History:   Procedure Laterality Date     HYSTEROSCOPY  2019       Family History   Problem Relation Age of  Onset     Hypertension Mother      Hyperlipidemia Mother      Arrhythmia Mother         palpitations     Heart Disease Father 51        MI with stent placement-50yo, CABG x 4 59yo     High cholesterol Father      Arrhythmia Father         ICD placed after open heart surgery     Osteoporosis Maternal Grandmother      Cerebrovascular Disease Paternal Grandmother 75     Hypertension Paternal Grandmother      Cancer Maternal Grandfather         bone marrow, leukemia     Diabetes Maternal Grandfather      Hypertension Maternal Grandfather      Alcoholism Maternal Grandfather      Heart Disease Paternal Grandfather 50        MI,      Heart Disease Paternal Uncle 50        x 2 brother     Migraines Sister      Glaucoma No family hx of      Macular Degeneration No family hx of        Social:     1 son, 10 yo   Clinical psychologist, 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 Family hx of osteoporosis  Caffeine: 2/day  Exercise: some walking      OB/GYN HISTORY:   LMP Patient's last menstrual period was 2022.  Cycle: Regular, 28 days  Hx abnormal pap? Normal 2022  dysmenorrhea/PMS: yes, migraines prior to menses, improved with daily Mg supplement.  Vasomotor sx: none  Contraception: none  G 1 P 1 A 0  Self Breast exam: yes    Current Outpatient Medications   Medication Sig Dispense Refill     Cholecalciferol (VITAMIN D PO) Take 1 tablet by mouth daily       COMPRESSION STOCKINGS Knee high compression stockings   20-30mmHg such as TEDS hose 2 each 0     multivitamin w/minerals (THERA-VIT-M) tablet Take 1 tablet by mouth daily       tretinoin (RETIN-A) 0.025 % external cream Use every night - pea sized amount (thin layer) to entire face 45 g 3     vitamin E (TOCOPHEROL) 400 units (180 mg) capsule Take by mouth daily       zinc sulfate (ZINCATE) 220 (50 Zn) MG capsule Take 220 mg by  "mouth daily       Ferrous Gluconate 240 (27 Fe) MG TABS Liquid 10mg per 10ml 1 tablet 1     Allergies   Allergen Reactions     Penicillins          ROS  CONSTITUTIONAL:NEGATIVE for fever, chills, change in weight  INTEGUMENTARY/SKIN: NEGATIVE for worrisome rashes, moles or lesions  + hair thinning  EYES: NEGATIVE for  Irritation, no vision concerns  ENT/MOUTH: NEGATIVE for ear, mouth and throat problems  RESP:NEGATIVE for significant cough or SOB  BREAST: NEGATIVE for masses, tenderness or discharge  CV: NEGATIVE for chest pain, RICKETTS, orthopnea, PND  or peripheral edema, + palpitations, left sided chest discomfort as above  GI: NEGATIVE for nausea, abdominal pain, heartburn, or change in bowel habits  :NEGATIVE for frequency, dysuria, or hematuria  MUSCULOSKELETAL:NEGATIVE for myalgia, + arthralgias of hands, stiffness, no redness/swelling  NEURO: NEGATIVE for weakness, dizziness or paresthesias, + hx of migraines  ENDOCRINE: NEGATIVE for polyuria/dipsia,  temperature intolerance, skin/hair changes  HEME/ALLERGY/IMMUNE: NEGATIVE for bleeding problems  PSYCHIATRIC: NEGATIVE for changes in mood or affect    EXAM  Temp 98.1  F (36.7  C)   Ht 1.64 m (5' 4.57\")   Wt 60.3 kg (133 lb)   BMI 22.43 kg/m      GENERAL APPEARANCE: Alert, pleasant, NAD  EYES: PERRL, EOMI, conjunctiva clear  HENT: TM normal bilaterally. Nose and mouth masked  NECK: no adenopathy, thyroid normal to palpation  RESP: lungs clear to auscultation bilaterally  AXILLARY: No palpable masses.  CV: regular rate and rhythm, normal S1 S2, no murmur, no carotid bruits  ABDOMEN: soft, nontender, without HSM or masses. Bowel sounds normal  MS: extremities normal- no gross deformities noted, no tender, hot or swollen joints. Some thickening at PIP joints of both hands, no redness or warmth  SKIN: no suspicious lesions or rashes  NEURO: Normal strength and tone, sensory exam grossly normal, DTR normoreflexive in upper and lower extremities  PSYCH: mentation " appears normal. and affect normal/bright.  EXT: no peripheral edema, pedal pulses palpable    Assessment:  (Z00.00) Routine general medical examination at a health care facility  (primary encounter diagnosis)  Comment: Healthy 42 year old female.  Plan: Today we discussed ways to maintain a healthy lifestyle with sensible eating, regular exercise and self cares. We dicussed calcium and Vitamin D intake, vaccinations and preventive health screens.  She declines flu and Hep B vaccines. She is eligible for COVID booster at pharmacy.    (E78.49) Other hyperlipidemia  Comment: Not fasting. Family hx of ASCVD, see below  Plan: Lipid Profile          (N92.0) Menorrhagia with regular cycle  Comment: Regular cycle, uses iron occasionally.  Hx of low ferritin, hair thinning  Plan: Recommend ferrous sulfate 65mg daily.  Gave names of dermatologist, [facility].    (Z82.49) Family history of coronary artery disease  Comment: Personal history of hyperlipidemia, strong family history of ASCVD. She had previous evaluation at [facility] in 2018. Has never had coronary artery calcium score.  Plan: Adult Cardiology Eval  Referral        Referred to cardiology for screening. Recommended CT calcium screening. Discussed Vegan diet as a way to lower cholesterol as opposed to statin medication.       Mildred Becerra MD  Internal Medicine/Pediatrics      I, Soco Oates, am serving as a scribe to document services personally performed by Dr. Mildred Becerra, based on data collection and the provider's statements to me. Dr. Becerra has reviewed, edited, and approved the above note.     Answers for HPI/ROS submitted by the patient on 11/3/2022  Frequency of exercise:: 1 day/week  Getting at least 3 servings of Calcium per day:: Yes  Diet:: Regular (no restrictions)  Taking medications regularly:: Yes  Medication side effects:: Not applicable  Bi-annual eye exam:: Yes  Dental care twice a year::  Yes  Sleep apnea or symptoms of sleep apnea:: None  abdominal pain: No  Blood in stool: No  Blood in urine: No  chest pain: No  chills: No  congestion: No  constipation: No  cough: No  diarrhea: No  dizziness: No  ear pain: No  eye pain: No  nervous/anxious: No  fever: No  frequency: No  genital sores: No  headaches: No  hearing loss: No  heartburn: No  arthralgias: Yes  joint swelling: No  peripheral edema: No  mood changes: No  myalgias: No  nausea: No  dysuria: No  palpitations: Yes  Skin sensation changes: No  sore throat: No  urgency: No  rash: No  shortness of breath: No  visual disturbance: Yes  weakness: No  pelvic pain: No  vaginal bleeding: No  vaginal discharge: No  tenderness: No  breast mass: No  breast discharge: No  Additional concerns today:: No  Duration of exercise:: 45-60 minutes

## 2022-11-03 NOTE — NURSING NOTE
"42 year old  Chief Complaint   Patient presents with     Physical     Patient is not fasting. No pap today. She had one done on 2022 and it was normal.       Blood pressure 121/81, pulse 68, temperature 98.1  F (36.7  C), resp. rate 16, height 1.64 m (5' 4.57\"), weight 60.3 kg (133 lb), last menstrual period 2022, SpO2 99 %, not currently breastfeeding. Body mass index is 22.43 kg/m .  Patient Active Problem List   Diagnosis     Migraine     Unicornate uterus     Migraine without aura and without status migrainosus, not intractable     Anxiety state     Constipation     Contact dermatitis and other eczema, due to unspecified cause     Endometriosis     Family history of ischemic heart disease     High-risk pregnancy, elderly primigravida     Hyperlipidemia      labor      premature rupture of membranes (PPROM) delivered, current hospitalization     Unicornuate uterus affecting pregnancy, antepartum     Varicose veins of legs     Vitamin D deficiency     Neoplasm of uncertain behavior     Acne vulgaris     Seborrheic keratoses, inflamed       Wt Readings from Last 2 Encounters:   22 60.3 kg (133 lb)   10/28/21 59 kg (130 lb 0.6 oz)     BP Readings from Last 3 Encounters:   22 121/81   10/28/21 112/78   20 124/82         Current Outpatient Medications   Medication     Cholecalciferol (VITAMIN D PO)     COMPRESSION STOCKINGS     multivitamin w/minerals (THERA-VIT-M) tablet     tretinoin (RETIN-A) 0.025 % external cream     vitamin E (TOCOPHEROL) 400 units (180 mg) capsule     zinc sulfate (ZINCATE) 220 (50 Zn) MG capsule     Ferrous Gluconate 240 (27 Fe) MG TABS     No current facility-administered medications for this visit.       Social History     Tobacco Use     Smoking status: Former     Packs/day: 1.00     Years: 5.00     Pack years: 5.00     Types: Cigarettes     Quit date: 2006     Years since quittin.1     Smokeless tobacco: Never   Substance Use Topics     " Alcohol use: Yes     Comment: 2-3 per week     Drug use: No       Health Maintenance Due   Topic Date Due     ADVANCE CARE PLANNING  Never done     HEPATITIS B IMMUNIZATION (1 of 3 - 3-dose series) Never done     PAP  Never done     COVID-19 Vaccine (4 - Booster for Moderna series) 01/07/2022       No results found for: PAP      Lia Celestin CMA, RAMYA  November 3, 2022 12:54 PM

## 2022-12-15 DIAGNOSIS — Z82.49 FAMILY HISTORY OF ISCHEMIC HEART DISEASE: ICD-10-CM

## 2022-12-15 DIAGNOSIS — E78.49 OTHER HYPERLIPIDEMIA: Primary | ICD-10-CM

## 2022-12-23 ENCOUNTER — LAB (OUTPATIENT)
Dept: LAB | Facility: CLINIC | Age: 42
End: 2022-12-23
Payer: COMMERCIAL

## 2022-12-23 ENCOUNTER — OFFICE VISIT (OUTPATIENT)
Dept: CARDIOLOGY | Facility: CLINIC | Age: 42
End: 2022-12-23
Payer: COMMERCIAL

## 2022-12-23 VITALS
WEIGHT: 134.9 LBS | HEIGHT: 65 IN | BODY MASS INDEX: 22.48 KG/M2 | HEART RATE: 66 BPM | DIASTOLIC BLOOD PRESSURE: 77 MMHG | OXYGEN SATURATION: 98 % | SYSTOLIC BLOOD PRESSURE: 117 MMHG

## 2022-12-23 DIAGNOSIS — Z82.49 FAMILY HISTORY OF ISCHEMIC HEART DISEASE: ICD-10-CM

## 2022-12-23 DIAGNOSIS — E78.49 OTHER HYPERLIPIDEMIA: ICD-10-CM

## 2022-12-23 DIAGNOSIS — E78.5 HYPERLIPIDEMIA, UNSPECIFIED HYPERLIPIDEMIA TYPE: Primary | ICD-10-CM

## 2022-12-23 LAB
CHOLEST SERPL-MCNC: 231 MG/DL
CREAT UR-MCNC: 200 MG/DL
CRP SERPL HS-MCNC: <0.15 MG/L
FASTING STATUS PATIENT QL REPORTED: YES
GLUCOSE SERPL-MCNC: 98 MG/DL (ref 70–99)
HDLC SERPL-MCNC: 62 MG/DL
LDLC SERPL CALC-MCNC: 160 MG/DL
MICROALBUMIN UR-MCNC: <12 MG/L
MICROALBUMIN/CREAT UR: NORMAL MG/G{CREAT}
NONHDLC SERPL-MCNC: 169 MG/DL
NT-PROBNP SERPL-MCNC: 128 PG/ML (ref 0–450)
TRIGL SERPL-MCNC: 45 MG/DL

## 2022-12-23 PROCEDURE — 99215 OFFICE O/P EST HI 40 MIN: CPT | Mod: 25 | Performed by: NURSE PRACTITIONER

## 2022-12-23 PROCEDURE — 80061 LIPID PANEL: CPT | Performed by: PATHOLOGY

## 2022-12-23 PROCEDURE — 82947 ASSAY GLUCOSE BLOOD QUANT: CPT | Performed by: PATHOLOGY

## 2022-12-23 PROCEDURE — 82043 UR ALBUMIN QUANTITATIVE: CPT | Mod: 90 | Performed by: PATHOLOGY

## 2022-12-23 PROCEDURE — 82570 ASSAY OF URINE CREATININE: CPT | Mod: 90 | Performed by: PATHOLOGY

## 2022-12-23 PROCEDURE — 93922 UPR/L XTREMITY ART 2 LEVELS: CPT | Performed by: NURSE PRACTITIONER

## 2022-12-23 PROCEDURE — 36415 COLL VENOUS BLD VENIPUNCTURE: CPT | Performed by: PATHOLOGY

## 2022-12-23 PROCEDURE — 86141 C-REACTIVE PROTEIN HS: CPT | Mod: 90 | Performed by: PATHOLOGY

## 2022-12-23 PROCEDURE — 99000 SPECIMEN HANDLING OFFICE-LAB: CPT | Performed by: PATHOLOGY

## 2022-12-23 PROCEDURE — 83880 ASSAY OF NATRIURETIC PEPTIDE: CPT | Performed by: PATHOLOGY

## 2022-12-23 PROCEDURE — 93000 ELECTROCARDIOGRAM COMPLETE: CPT | Performed by: INTERNAL MEDICINE

## 2022-12-23 RX ORDER — CHOLECALCIFEROL (VITAMIN D3) 100000/G
1 POWDER (GRAM) MISCELLANEOUS
COMMUNITY

## 2022-12-23 NOTE — LETTER
2022      RE: Ophelia Vincent  [address]  [facility] [address]       Dear Colleague,    Thank you for the opportunity to participate in the care of your patient, Ophelia Vincent, at the [facility]ARDIOVASCULAR DISE[facility] PRE[facility]. Please see a copy of my visit note below.      [facility] Cardiovascular Disease Prevention - Exam Note    Active Problems   Patient Active Problem List    Diagnosis Date Noted     Neoplasm of uncertain behavior 2022     Priority: Medium     Acne vulgaris 2022     Priority: Medium     Seborrheic keratoses, inflamed 2022     Priority: Medium     Constipation 10/14/2021     Priority: Medium     Formatting of this note might be different from the original.  Created by Conversion    Replacement Utility updated for latest IMO load       Contact dermatitis and other eczema, due to unspecified cause 10/14/2021     Priority: Medium     Formatting of this note might be different from the original.  Created by Conversion       Endometriosis 10/14/2021     Priority: Medium     Formatting of this note might be different from the original.  Created by Conversion    Replacement Utility updated for latest IMO load       Hyperlipidemia 10/14/2021     Priority: Medium     Formatting of this note might be different from the original.  Created by Conversion       Migraine without aura and without status migrainosus, not intractable 2018     Priority: Medium     Unicornate uterus 2016     Priority: Medium      labor 2014     Priority: Medium      premature rupture of membranes (PPROM) delivered, current hospitalization 2014     Priority: Medium     High-risk pregnancy, elderly primigravida 2013     Priority: Medium     Formatting of this note is different from the original.  Low risk Materna 21         "Unicornuate uterus affecting pregnancy, antepartum 09/27/2013     Priority: Medium     Varicose veins of legs 03/17/2013     Priority: Medium     Vitamin D deficiency 03/13/2013     Priority: Medium     Migraine 05/22/2012     Priority: Medium     Anxiety state 05/22/2012     Priority: Medium     Formatting of this note might be different from the original.  Created by Conversion    Replacement Utility updated for latest IMO load       Family history of ischemic heart disease 01/29/2010     Priority: Medium       Reason For Visit   Patient here for [facility] early detection of atherosclerosis and CVD exam.    Pain Evaluation  Current history of pain associated with this visit is: denied    Cardiac risk factors:  - age   - smoking  - elevated BMI   + Family history CVD  - Diet   - Hypertension    HPI   Ophelia Vincent is a 42 year old year old female with family history of heart diease.  Her father had a MI at age 50 and CABG surgery at age 60 and also has hyperlipidemia..  Her paternal grandfather had a MI at age 50. Two paternal uncles also has MI at age 50. She has a history of hyperlipidemia and migraine headaches. 3. Her Lp(a) was 86 in 2018.  She does not check her BP at home.  She was seen in the [facility] CV Prevention clinic 9/18, score  Her primary care provider is Dr. Mildred Becerra at [facility]. 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  Her family is from [place]. She notices heart palpitations 2x/week that last for seconds.  She thinks that she has them more often with added stress.  She also notices a \"pinch\" in her chest, \"electric charge\" 1x/month that lasts for seconds. She points to her left sided rib cage to describe this.  She also has migraines Today in clinic she denies chest pain at rest, with activity, while sleeping, SOB at rest, with activity or while sleeping, lightheadedness, lower leg edema, calf cramps, indigestion, headaches or issues with her memory. "     Nutrition assessment per patient report:   Foods with fat/cholesterol (fried foods, fatty meats, junk food):  0 servings   Fruits and vegetables (  cup cooked, 1 cup raw):  2-4 servings of vegetables/day, 1-3 servings of fruit/day  Caffeine (1 cup coffee, soda, etc):  2 cups/coffe day  Alcohol servings (12 oz. beer, 4 oz. wine, 1  oz. in mixed drink):  rare glass of white wine, causes headaches  Special dietary habits:  increased vegetable intake  Typical breakfast: oatmeal, applesauce, banana, nuts, weekends eggs/pancakes                 Lunch: vegetables and hummus or salad                 Dinner: protein, vegetable, no red meat                 Snacks: apples, nuts                 Drinks:  water  Activity  Patient is not exercising currently, has exercised in the past    Sleep pattern: wakes up with her son, then has trouble getting back to sleep    Laboratory Results Review  We discussed laboratory results today including lipids targets and how foods influence cholesterol.    Weight  Her perceived healthy weight is  128-130 pounds.  A normal BMI of 25 is equal to 150 pounds.  The current BMI of 22.8 is normal weight range.      PMH   Past Medical History:   Diagnosis Date     Family history of ischemic heart disease      Hyperlipidemia      Migraine     associated with mentral cycle     Unicornate uterus     ovulates every other month     Vitamin D deficiency        PSH  Past Surgical History:   Procedure Laterality Date     HYSTEROSCOPY  02/2019       Current Meds   Current Outpatient Medications   Medication Sig Dispense Refill     Cholecalciferol (VITAMIN D3) 096441 UNIT/GM POWD Take 1 tablet by mouth       Ferrous Gluconate 240 (27 Fe) MG TABS Liquid 10mg per 10ml 1 tablet 1     vitamin E (TOCOPHEROL) 400 units (180 mg) capsule Take by mouth daily       zinc sulfate (ZINCATE) 220 (50 Zn) MG capsule Take 220 mg by mouth daily         Allergies      Allergies   Allergen Reactions     Penicillins         Family Hx   Family History   Problem Relation Age of Onset     Hypertension Mother      Hyperlipidemia Mother      Arrhythmia Mother         palpitations     Coronary Artery Disease Father 51        MI with stent placement-52yo, CABG x 4 at age 57yo, ICD     Hyperlipidemia Father      Arrhythmia Father         ICD placed after open heart surgery     Migraines Sister      Osteoporosis Maternal Grandmother      Cancer Maternal Grandfather         Leukemia     Diabetes Maternal Grandfather      Hypertension Maternal Grandfather      Alcoholism Maternal Grandfather      Cerebrovascular Disease Paternal Grandmother 75        stroke     Hypertension Paternal Grandmother      Coronary Artery Disease Paternal Grandfather 50        MI,      Coronary Artery Disease Paternal Uncle 50        x 2 brother  CABG     Glaucoma No family hx of      Macular Degeneration No family hx of        Social History   She is  1 child.     Tobacco History  History   Smoking Status     Former     Packs/day: 1.00     Years: 5.00     Types: Cigarettes     Quit date: 2006   Smokeless Tobacco     Never       ROS  CONSTITUTIONAL:  No fever, chills, or sweats. No weight gain/loss.   EENT:  No visual disturbance, ear ache, epistaxis, sore throat  ALLERGIES/IMMUNOLOGIC:  Negative  RESPIRATORY:  No cough, hemoptysis  CARDIOVASCULAR:  As per HPI  GI:  No nausea, vomiting, hematemesis, melena  :  No urinary frequency, dysuria, or hematuria  INTEGUMENT:  Negative  PSYCHIATRIC:  Negative  NEURO:  Negative  ENDOCRINE:  Negative  MUSCULOSKELETAL:  Negative     Vital Signs   LMP 2022       Waist: 27.5 inches  Hip: 37 inches    Physical Exam   In general, the patient is a pleasant female in no apparent distress   HEENT: NC/AT, PERRLA, EOMI, sclerae white, not injected. Nares clear, pharynx without erythema or exudate, dentition intact    Neck: No adenopathy, no thyromegaly, carotids +4/4 bilaterally without bruits,  no jugular  venous distension   Lungs: Breath sounds clear bilaterally, without crackles, ronchi, or wheezes  Cor: RRR, S1S2 without murmur, rub, click, or gallop, the PMI is in the 5th ICS in the midclavicular line  Abdomen: Soft, nontender, nondistended, BS+ in all 4 quadrants, without hepatomegaly, no aorta or renal artery bruits  Extremities: No clubbing, cyanosis, or edema. DP and PT pulses +2/4 bilaterally    The 10-year ASCVD risk score (Bitely JENNIFER Jr., et al., 2013) is: 0.7  Values used to calculate the score:   Age: 42 year old   Sex: female   Is Non- : No   Diabetic: No   Tobacco smoker: No   Systolic Blood Press: 120 mmHg   Is BP treated: No   HDL Cholesterol: 62 mg/dL   Total Cholesterol: 231 mg/dL    Recent Labs  Lab Results   Component Value Date    GLC 98 12/23/2022    GLC 84 10/28/2021    GLC 96.0 09/26/2019      Lab Results   Component Value Date    NTBNP 37 09/28/2018     No results found for: NTBNPI   Lab Results   Component Value Date    UCRR 60 09/28/2018      Lab Results   Component Value Date    MICROL <5 09/28/2018      No results found for: MICROALBUMIN   Lab Results   Component Value Date    CRP 0.9 09/28/2018      Lab Results   Component Value Date    CHOL 231 (H) 12/23/2022    CHOL 208.0 (H) 09/26/2019      Lab Results   Component Value Date    TRIG 45 12/23/2022    TRIG 70.0 09/26/2019      Lab Results   Component Value Date    HDL 62 12/23/2022    HDL 58.0 09/26/2019      Lab Results   Component Value Date     (H) 12/23/2022    .0 (H) 09/26/2019     (H) 09/28/2018      Lab Results   Component Value Date    VLDL 14.0 09/26/2019      Lab Results   Component Value Date    CHOLHDLRATIO 3.6 09/26/2019     Lab Results   Component Value Date    NHDL 169 (H) 12/23/2022    NHDL 140 (H) 09/28/2018        Assessment:    Cardiovascular:  Asymptomatic, she is not complaining of chest pain, ECG NSR, no plaque detected in her carotid arteries.  Recommend scheduling a CAC  to further investigate her risk of heart disease in approximately 5 years.     Blood Pressure:  She does not take BP medication, -120/74-77 mmHg    Lipids:  She does not take a statin medication  !LIPID/HEPATIC Latest Ref Rng & Units 3/27/2015 3/16/2016 3/28/2017   CHOL <200 mg/dL 190.0 188.0 236.0 (H)   TRIGLYCERIDES <150 mg/dL 51.0 61.0 70.0   HDL >=50 mg/dL 52.0 47.0 (L) 66.0   LDL <=100 mg/dL 128.0 129.0 155.0 (H)   VLDL 7.0 - 32.0 10.0 12.0 14.0   NHDL <130 mg/dL      CHOLHDLRATIO 0.0 - 5.0 3.6 4.0 3.5   AST 0 - 45 U/L      ALT 0 - 50 U/L      CRP mg/L        !LIPID/HEPATIC Latest Ref Rng & Units 5/15/2018 9/28/2018 9/26/2019   CHOL <200 mg/dL 213.0 (H) 193 208.0 (H)   TRIGLYCERIDES <150 mg/dL 69.0 56 70.0   HDL >=50 mg/dL 59.0 53 58.0   LDL <=100 mg/dL 140.0 (H) 129 (H) 135.0 (H)   VLDL 7.0 - 32.0 14.0  14.0   NHDL <130 mg/dL  140 (H)    CHOLHDLRATIO 0.0 - 5.0 3.6  3.6   AST 0 - 45 U/L   28.0   ALT 0 - 50 U/L   26.0   CRP mg/L  0.9      !LIPID/HEPATIC Latest Ref Rng & Units 10/28/2021 11/3/2022 12/23/2022   CHOL <200 mg/dL 237 (H) 238 (H) 231 (H)   TRIGLYCERIDES <150 mg/dL 53 71 45   HDL >=50 mg/dL 66 63 62   LDL <=100 mg/dL 160 (H) 161 (H) 160 (H)   VLDL 7.0 - 32.0      NHDL <130 mg/dL 171 (H) 175 (H) 169 (H)   CHOLHDLRATIO 0.0 - 5.0      AST 0 - 45 U/L 23     ALT 0 - 50 U/L 26     CRP mg/L          Glucose: 98    Sleep pattern:  Sleep hygiene reviewed during clinic visit, handout given to patient    Weight Management: BMI 22.8    Return to Clinic: 5 years    Health Habit Summary:  Nutrition: Heart Healthy Eating:  most of the time   Exercise:  not regularly active  Weight:  normal weight range  Tobacco Use:  Past smoker    This case was presented to Dr. Valdes and Dr. Rodriguez Miller during our weekly conference.     Time spent for patient visit was 60 minutes with more than half the time spent on counseling and coordination of care.    PETER Pandey CNP       CC  Patient Care Team:  Mildred Becerra  MD Munira as PCP - General (Internal Medicine)  Luiza Waldrop MD as MD (Dermatology)  Jony Covington MD as MD (Neurology)  Marivel Brady PABlayneC as Physician Assistant (Physician Assistant - Medical)  Mildred Becerra MD as Assigned PCP  Geneva Hernandez OD (Optometry)  Mildred Becerra MD as Referring Physician (Internal Medicine)  Neisha Mason MD as MD (Dermatology)  Reji Strong MD as MD (Dermapathology)  Reji Strong MD as Assigned Surgical Provider  Collette Trujillo APRN CNP as Nurse Practitioner (Cardiovascular Disease)  KELLEY TATE    Answers for HPI/ROS submitted by the patient on 12/22/2022  General Symptoms: No  Skin Symptoms: No  HENT Symptoms: No  EYE SYMPTOMS: No  HEART SYMPTOMS: No  LUNG SYMPTOMS: No  INTESTINAL SYMPTOMS: No  URINARY SYMPTOMS: No  GYNECOLOGIC SYMPTOMS: No  BREAST SYMPTOMS: No  SKELETAL SYMPTOMS: No  BLOOD SYMPTOMS: No  NERVOUS SYSTEM SYMPTOMS: No  MENTAL HEALTH SYMPTOMS: No        Dalton Test Results    WALKING BLOOD PRESSURE RESPONSE (3 minute, 5 MET level walk)   Pre BP: 100/66 mmHg  3 min BP: 129/60 mmHg  1 min post BP: 110/70 mmHg    Pre HR: 75 bpm  3 min HR: 120 bpm  1 min post HR: 68 bpm     Test results: Walking blood pressure response to 3 minutes activity is in normal range.     ABDOMINAL AORTA ULTRASOUND (< 2.5 normal, borderline 2.5-2.9, abnormal > 3)   SupraIliac 1.7 cm    SupraRenal 2.0 cm    InfraRenal Proximal 1.7 cm    InfraRenal Distal 1.7 cm      Abdominal Aorta Assessment:  normal    LEFT VENTRICULAR ULTRASOUND MEASUREMENTS (adjusted for BSA)  LVIDD 44.40 mm   Septa 7.5 mm   Posterior 7.8 mm     Left Ventricular US Assessment:  normal    Carotid Artery IMT measurements report and plaques in the small area examined:   Left IMT 0.614 mm  Plaques none    Right IMT 0.586 mm  Plaques none     Test results: Carotid arteries wall thickening is in normal range after adjusting for age and gender with no plaque  formations present.     ECG (see tracing):  normal sinus rhythm;  rate: 66 bpm    Arterial Elasticity per age and gender (see printout):   C1 14.4 mL/mmHg x 10  normal   C2 7.8 mL/mmHg x 100 normal   Supine blood pressure:122/71 mmHg     Test results: Arterial elasticity of the large and small size arteries is in normal range after adjusting for age and gender.     Dalton Disease Score: 1 (For pro- BNP)    Samaria Rolon          Please do not hesitate to contact me if you have any questions/concerns.     Sincerely,     PETER Pandey CNP

## 2022-12-23 NOTE — PROGRESS NOTES
[facility] for Cardiovascular Disease Prevention - Exam Note    Active Problems   Patient Active Problem List    Diagnosis Date Noted     Neoplasm of uncertain behavior 2022     Priority: Medium     Acne vulgaris 2022     Priority: Medium     Seborrheic keratoses, inflamed 2022     Priority: Medium     Constipation 10/14/2021     Priority: Medium     Formatting of this note might be different from the original.  Created by Conversion    Replacement Utility updated for latest IMO load       Contact dermatitis and other eczema, due to unspecified cause 10/14/2021     Priority: Medium     Formatting of this note might be different from the original.  Created by Conversion       Endometriosis 10/14/2021     Priority: Medium     Formatting of this note might be different from the original.  Created by Conversion    Replacement Utility updated for latest IMO load       Hyperlipidemia 10/14/2021     Priority: Medium     Formatting of this note might be different from the original.  Created by Conversion       Migraine without aura and without status migrainosus, not intractable 2018     Priority: Medium     Unicornate uterus 2016     Priority: Medium      labor 2014     Priority: Medium      premature rupture of membranes (PPROM) delivered, current hospitalization 2014     Priority: Medium     High-risk pregnancy, elderly primigravida 2013     Priority: Medium     Formatting of this note is different from the original.  Low risk Materna 21        Unicornuate uterus affecting pregnancy, antepartum 2013     Priority: Medium     Varicose veins of legs 2013     Priority: Medium     Vitamin D deficiency 2013     Priority: Medium     Migraine 2012     Priority: Medium     Anxiety state 2012     Priority: Medium     Formatting of this note might be different from the original.  Created by Conversion    Replacement Utility updated  "for latest IMO load       Family history of ischemic heart disease 01/29/2010     Priority: Medium       Reason For Visit   Patient here for [facility] early detection of atherosclerosis and CVD exam.    Pain Evaluation  Current history of pain associated with this visit is: denied    Cardiac risk factors:  - age   - smoking  - elevated BMI   + Family history CVD  - Diet   - Hypertension    HPI   Ophelia Vincent is a 42 year old year old female with family history of heart diease.  Her father had a MI at age 50 and CABG surgery at age 60 and also has hyperlipidemia..  Her paternal grandfather had a MI at age 50. Two paternal uncles also has MI at age 50. She has a history of hyperlipidemia and migraine headaches. 3. Her Lp(a) was 86 in 2018.  She does not check her BP at home.  She was seen in the [facility] Prevention clinic 9/18, score  Her primary care provider is Dr. Mildred Becerra at [facility]. 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  Her family is from [place]. She notices heart palpitations 2x/week that last for seconds.  She thinks that she has them more often with added stress.  She also notices a \"pinch\" in her chest, \"electric charge\" 1x/month that lasts for seconds. She points to her left sided rib cage to describe this.  She also has migraines Today in clinic she denies chest pain at rest, with activity, while sleeping, SOB at rest, with activity or while sleeping, lightheadedness, lower leg edema, calf cramps, indigestion, headaches or issues with her memory.     Nutrition assessment per patient report:   Foods with fat/cholesterol (fried foods, fatty meats, junk food):  0 servings   Fruits and vegetables (  cup cooked, 1 cup raw):  2-4 servings of vegetables/day, 1-3 servings of fruit/day  Caffeine (1 cup coffee, soda, etc):  2 cups/coffe day  Alcohol servings (12 oz. beer, 4 oz. wine, 1  oz. in mixed drink):  rare glass of white wine, causes headaches  Special dietary " habits:  increased vegetable intake  Typical breakfast: oatmeal, applesauce, banana, nuts, weekends eggs/pancakes                 Lunch: vegetables and hummus or salad                 Dinner: protein, vegetable, no red meat                 Snacks: apples, nuts                 Drinks:  water  Activity  Patient is not exercising currently, has exercised in the past    Sleep pattern: wakes up with her son, then has trouble getting back to sleep    Laboratory Results Review  We discussed laboratory results today including lipids targets and how foods influence cholesterol.    Weight  Her perceived healthy weight is  128-130 pounds.  A normal BMI of 25 is equal to 150 pounds.  The current BMI of 22.8 is normal weight range.      PMH   Past Medical History:   Diagnosis Date     Family history of ischemic heart disease      Hyperlipidemia      Migraine     associated with mentral cycle     Unicornate uterus     ovulates every other month     Vitamin D deficiency        PSH  Past Surgical History:   Procedure Laterality Date     HYSTEROSCOPY  02/2019       Current Meds   Current Outpatient Medications   Medication Sig Dispense Refill     Cholecalciferol (VITAMIN D3) 053924 UNIT/GM POWD Take 1 tablet by mouth       Ferrous Gluconate 240 (27 Fe) MG TABS Liquid 10mg per 10ml 1 tablet 1     vitamin E (TOCOPHEROL) 400 units (180 mg) capsule Take by mouth daily       zinc sulfate (ZINCATE) 220 (50 Zn) MG capsule Take 220 mg by mouth daily         Allergies      Allergies   Allergen Reactions     Penicillins        Family Hx   Family History   Problem Relation Age of Onset     Hypertension Mother      Hyperlipidemia Mother      Arrhythmia Mother         palpitations     Coronary Artery Disease Father 51        MI with stent placement-52yo, CABG x 4 at age 59yo, ICD     Hyperlipidemia Father      Arrhythmia Father         ICD placed after open heart surgery     Migraines Sister      Osteoporosis Maternal Grandmother      Cancer  Maternal Grandfather         Leukemia     Diabetes Maternal Grandfather      Hypertension Maternal Grandfather      Alcoholism Maternal Grandfather      Cerebrovascular Disease Paternal Grandmother 75        stroke     Hypertension Paternal Grandmother      Coronary Artery Disease Paternal Grandfather 50        MI,      Coronary Artery Disease Paternal Uncle 50        x 2 brother  CABG     Glaucoma No family hx of      Macular Degeneration No family hx of        Social History   She is  1 child.     Tobacco History  History   Smoking Status     Former     Packs/day: 1.00     Years: 5.00     Types: Cigarettes     Quit date: 2006   Smokeless Tobacco     Never       ROS  CONSTITUTIONAL:  No fever, chills, or sweats. No weight gain/loss.   EENT:  No visual disturbance, ear ache, epistaxis, sore throat  ALLERGIES/IMMUNOLOGIC:  Negative  RESPIRATORY:  No cough, hemoptysis  CARDIOVASCULAR:  As per HPI  GI:  No nausea, vomiting, hematemesis, melena  :  No urinary frequency, dysuria, or hematuria  INTEGUMENT:  Negative  PSYCHIATRIC:  Negative  NEURO:  Negative  ENDOCRINE:  Negative  MUSCULOSKELETAL:  Negative     Vital Signs   LMP 2022       Waist: 27.5 inches  Hip: 37 inches    Physical Exam   In general, the patient is a pleasant female in no apparent distress   HEENT: NC/AT, PERRLA, EOMI, sclerae white, not injected. Nares clear, pharynx without erythema or exudate, dentition intact    Neck: No adenopathy, no thyromegaly, carotids +4/4 bilaterally without bruits,  no jugular venous distension   Lungs: Breath sounds clear bilaterally, without crackles, ronchi, or wheezes  Cor: RRR, S1S2 without murmur, rub, click, or gallop, the PMI is in the 5th ICS in the midclavicular line  Abdomen: Soft, nontender, nondistended, BS+ in all 4 quadrants, without hepatomegaly, no aorta or renal artery bruits  Extremities: No clubbing, cyanosis, or edema. DP and PT pulses +2/4 bilaterally    The 10-year ASCVD risk  score (Derrick RODRIGUEZ Jr., et al., 2013) is: 0.7  Values used to calculate the score:   Age: 42 year old   Sex: female   Is Non- : No   Diabetic: No   Tobacco smoker: No   Systolic Blood Press: 120 mmHg   Is BP treated: No   HDL Cholesterol: 62 mg/dL   Total Cholesterol: 231 mg/dL    Recent Labs  Lab Results   Component Value Date    GLC 98 12/23/2022    GLC 84 10/28/2021    GLC 96.0 09/26/2019      Lab Results   Component Value Date    NTBNP 37 09/28/2018     No results found for: NTBNPI   Lab Results   Component Value Date    UCRR 60 09/28/2018      Lab Results   Component Value Date    MICROL <5 09/28/2018      No results found for: MICROALBUMIN   Lab Results   Component Value Date    CRP 0.9 09/28/2018      Lab Results   Component Value Date    CHOL 231 (H) 12/23/2022    CHOL 208.0 (H) 09/26/2019      Lab Results   Component Value Date    TRIG 45 12/23/2022    TRIG 70.0 09/26/2019      Lab Results   Component Value Date    HDL 62 12/23/2022    HDL 58.0 09/26/2019      Lab Results   Component Value Date     (H) 12/23/2022    .0 (H) 09/26/2019     (H) 09/28/2018      Lab Results   Component Value Date    VLDL 14.0 09/26/2019      Lab Results   Component Value Date    CHOLHDLRATIO 3.6 09/26/2019     Lab Results   Component Value Date    NHDL 169 (H) 12/23/2022    NHDL 140 (H) 09/28/2018        Assessment:    Cardiovascular:  Asymptomatic, she is not complaining of chest pain, ECG NSR, no plaque detected in her carotid arteries.  Recommend scheduling a CAC to further investigate her risk of heart disease in approximately 5 years.     Blood Pressure:  She does not take BP medication, -120/74-77 mmHg    Lipids:  She does not take a statin medication  !LIPID/HEPATIC Latest Ref Rng & Units 3/27/2015 3/16/2016 3/28/2017   CHOL <200 mg/dL 190.0 188.0 236.0 (H)   TRIGLYCERIDES <150 mg/dL 51.0 61.0 70.0   HDL >=50 mg/dL 52.0 47.0 (L) 66.0   LDL <=100 mg/dL 128.0 129.0 155.0 (H)    VLDL 7.0 - 32.0 10.0 12.0 14.0   NHDL <130 mg/dL      CHOLHDLRATIO 0.0 - 5.0 3.6 4.0 3.5   AST 0 - 45 U/L      ALT 0 - 50 U/L      CRP mg/L        !LIPID/HEPATIC Latest Ref Rng & Units 5/15/2018 9/28/2018 9/26/2019   CHOL <200 mg/dL 213.0 (H) 193 208.0 (H)   TRIGLYCERIDES <150 mg/dL 69.0 56 70.0   HDL >=50 mg/dL 59.0 53 58.0   LDL <=100 mg/dL 140.0 (H) 129 (H) 135.0 (H)   VLDL 7.0 - 32.0 14.0  14.0   NHDL <130 mg/dL  140 (H)    CHOLHDLRATIO 0.0 - 5.0 3.6  3.6   AST 0 - 45 U/L   28.0   ALT 0 - 50 U/L   26.0   CRP mg/L  0.9      !LIPID/HEPATIC Latest Ref Rng & Units 10/28/2021 11/3/2022 12/23/2022   CHOL <200 mg/dL 237 (H) 238 (H) 231 (H)   TRIGLYCERIDES <150 mg/dL 53 71 45   HDL >=50 mg/dL 66 63 62   LDL <=100 mg/dL 160 (H) 161 (H) 160 (H)   VLDL 7.0 - 32.0      NHDL <130 mg/dL 171 (H) 175 (H) 169 (H)   CHOLHDLRATIO 0.0 - 5.0      AST 0 - 45 U/L 23     ALT 0 - 50 U/L 26     CRP mg/L          Glucose: 98    Sleep pattern:  Sleep hygiene reviewed during clinic visit, handout given to patient    Weight Management: BMI 22.8    Return to Clinic: 5 years    Health Habit Summary:  Nutrition: Heart Healthy Eating:  most of the time   Exercise:  not regularly active  Weight:  normal weight range  Tobacco Use:  Past smoker    This case was presented to Dr. Valdes and Dr. Rodriguez Miller during our weekly conference.     Time spent for patient visit was 60 minutes with more than half the time spent on counseling and coordination of care.    Collette Trujillo APRN CNP       CC  Patient Care Team:  Mildred Becerra MD as PCP - General (Internal Medicine)  Luiza Waldrop MD as MD (Dermatology)  Jony Covington MD as MD (Neurology)  Marivel Brady PA-C as Physician Assistant (Physician Assistant - Medical)  Mildred Becerra MD as Assigned PCP  Geneva Hernandez OD (Optometry)  Mildred Becerra MD as Referring Physician (Internal Medicine)  Neisha Mason MD as MD (Dermatology)  Tae  Reji RUDD MD as MD (Dermapathology)  Reji Strong MD as Assigned Surgical Provider  Collette Trujillo APRN CNP as Nurse Practitioner (Cardiovascular Disease)  KELLEY TATE    Answers for HPI/ROS submitted by the patient on 12/22/2022  General Symptoms: No  Skin Symptoms: No  HENT Symptoms: No  EYE SYMPTOMS: No  HEART SYMPTOMS: No  LUNG SYMPTOMS: No  INTESTINAL SYMPTOMS: No  URINARY SYMPTOMS: No  GYNECOLOGIC SYMPTOMS: No  BREAST SYMPTOMS: No  SKELETAL SYMPTOMS: No  BLOOD SYMPTOMS: No  NERVOUS SYSTEM SYMPTOMS: No  MENTAL HEALTH SYMPTOMS: No

## 2022-12-26 NOTE — PROGRESS NOTES
[facility] Test Results    WALKING BLOOD PRESSURE RESPONSE (3 minute, 5 MET level walk)   Pre BP: 100/66 mmHg  3 min BP: 129/60 mmHg  1 min post BP: 110/70 mmHg    Pre HR: 75 bpm  3 min HR: 120 bpm  1 min post HR: 68 bpm     Test results: Walking blood pressure response to 3 minutes activity is in normal range.     ABDOMINAL AORTA ULTRASOUND (< 2.5 normal, borderline 2.5-2.9, abnormal > 3)   SupraIliac 1.7 cm    SupraRenal 2.0 cm    InfraRenal Proximal 1.7 cm    InfraRenal Distal 1.7 cm      Abdominal Aorta Assessment:  normal    LEFT VENTRICULAR ULTRASOUND MEASUREMENTS (adjusted for BSA)  LVIDD 44.40 mm   Septa 7.5 mm   Posterior 7.8 mm     Left Ventricular US Assessment:  normal    Carotid Artery IMT measurements report and plaques in the small area examined:   Left IMT 0.614 mm  Plaques none    Right IMT 0.586 mm  Plaques none     Test results: Carotid arteries wall thickening is in normal range after adjusting for age and gender with no plaque formations present.     ECG (see tracing):  normal sinus rhythm;  rate: 66 bpm    Arterial Elasticity per age and gender (see printout):   C1 14.4 mL/mmHg x 10  normal   C2 7.8 mL/mmHg x 100 normal   Supine blood pressure:122/71 mmHg     Test results: Arterial elasticity of the large and small size arteries is in normal range after adjusting for age and gender.     [facility] Disease Score: 1 (For pro- BNP)    aSmaria Rolon

## 2022-12-29 NOTE — PATIENT INSTRUCTIONS
Screening Results Summary Report     [facility] for Cardiovascular Disease Prevention    Thank you for choosing to participate in the prevention screening offered at the [facility]. Prevention screening is important part of health care.  Atherosclerosis may result in heart attacks, strokes, heart failure, peripheral artery disease and shortened life expectancy. The risk for premature development of this disease is both genetic (family history) and environmental (diet, exercise, lifestyle, etc.).  Goals of your cardiovascular prevention screening include detecting the earliest signs of blood vessel or heart abnormalities, and identifying markers for risk that can be treated if identified early. Recommendations are included to improve health habits. In some cases medication may be recommended to help slow progression of disease. Our goal is to assist you in prevention of a heart attack, stroke and other cardiovascular diseases that are the major cause of illness and mortality in our society.     Your total cholesterol and LDL (bad cholesterol) results are not in the optimal range and increase your cardiovascular risk.  Target levels depend on the presence or absence of cardiovascular disease.   Your target level of total cholesterol are less than 200 mg/dL, LDL less than 100 mg/dL, HDL 40 ml/dL or higher, and a triglyceride level of less than 150 mg/dL.   We recommend continuation of ongoing health habit modification (heart healthy nutrition, maintaining your weight within a normal weight range and an exercise routine) to maintain these levels.  An ideal weight range is a body mass index of 25 or less.     2. Your blood pressure with 3 minutes of moderate (5 met level) treadmill walking increased 32 mmHg (--> systolic-top number) indicating an abnormal rise. This rise in blood pressure can be related to elevated weight, lack of physical exercise/fitness or reduced blood vessel function, which is linked to  the presence of or the risk of development of high blood pressure over time. No treatment is needed at this time.      3. Your diet is heart healthy and well balanced.  We recommend increasing your intake of vegetables to 4-5 servings/day and increasing your fruit intake to 3-4 servings/day and incorporating healthy fats of the the Mediterranean diet into your diet. Eating salmon and using extra virgin olive oil are good examples. Nutrients found in fruits, vegetables and whole grains have been shown to be beneficial for the long-term health of your heart and blood vessels.     4. The American Heart Association recommends 150 minutes of exercise per week, including strength (resistance) training.  Regular exercise can help maintain or lower cholesterol, blood pressure, blood glucose and improve the health of your heart and blood vessels. We recommend increasing your exercise routine to 5 days per week and adding a cardiovascular component to your exercise routine.  Always exercise within your comfort zone (no chest pain, able to talk comfortably).     5.  We suggest that you consider incorporating 4-7-8 relaxation breathing, mindfulness stress reduction, meditation, yoga,and/or aromatherapy into your healthy lifestyle routine. All of these integrative therapies have been shown to be useful in reducing stress and promoting health. The website for the [facility] at the [facility] is [email or web address]. Taking Charge of Your Health and Wellbeing is a wonderful assessment tool to learn more about your wellbeing.    6.  Recommend waiting to order a coronary artery calcium score test until closer to age  50.     7.  Return to clinic in 5 years.    8. Thank you for choosing to participate in the prevention screening at [facility] clinic.  Cardiovascular prevention screening is important. Atherosclerosis may result in heart attacks, strokes, heart failure,  peripheral artery disease.    Collette Trujillo, DNP, APRN, FNP-C

## 2023-01-02 LAB
ATRIAL RATE - MUSE: 66 BPM
DIASTOLIC BLOOD PRESSURE - MUSE: NORMAL MMHG
INTERPRETATION ECG - MUSE: NORMAL
P AXIS - MUSE: 54 DEGREES
PR INTERVAL - MUSE: 142 MS
QRS DURATION - MUSE: 88 MS
QT - MUSE: 418 MS
QTC - MUSE: 438 MS
R AXIS - MUSE: 59 DEGREES
SYSTOLIC BLOOD PRESSURE - MUSE: NORMAL MMHG
T AXIS - MUSE: 51 DEGREES
VENTRICULAR RATE- MUSE: 66 BPM

## 2023-11-15 ASSESSMENT — ENCOUNTER SYMPTOMS
WEAKNESS: 0
DIZZINESS: 0
FREQUENCY: 0
HEMATURIA: 0
JOINT SWELLING: 0
NAUSEA: 0
EYE PAIN: 0
COUGH: 0
HEARTBURN: 0
MYALGIAS: 0
PALPITATIONS: 0
BREAST MASS: 0
DYSURIA: 0
ABDOMINAL PAIN: 0
DIARRHEA: 0
SORE THROAT: 0
NERVOUS/ANXIOUS: 0
CONSTIPATION: 0
PARESTHESIAS: 0
HEADACHES: 0
ARTHRALGIAS: 0
SHORTNESS OF BREATH: 0
FEVER: 0
CHILLS: 0
HEMATOCHEZIA: 0

## 2023-11-15 NOTE — PATIENT INSTRUCTIONS
Calcium 1200mg daily  Vitamin D 3468-3196 international unit(s) daily    Iron supplement MWF Sa   Take with 100mg vitamin C or take with OJ or grapefruit juice    Preventive Health Recommendations  Female Ages 40 to 49    Yearly exam:   See your health care provider every year in order to  Review health changes.   Discuss preventive care.    Review your medicines if your doctor prescribed any.    Get a Pap test every three years (unless you have an abnormal result and your provider advises testing more often).    If you get Pap tests with HPV test, you only need to test every 5 years, unless you have an abnormal result. You do not need a Pap test if your uterus was removed (hysterectomy) and you have not had cancer.    You should be tested each year for STDs (sexually transmitted diseases), if you're at risk.   Ask your doctor if you should have a mammogram.    Have a colonoscopy (test for colon cancer) beginning at age 45.  Ask your provider about other options like a yearly FIT test or Cologuard test every 3 years (stool tests)      Have a cholesterol test every 5 years.     Have a diabetes test (fasting glucose) after age 45. If you are at risk for diabetes, you should have this test every 3 years.    Shots: Get a flu shot each year. Get a tetanus shot every 10 years.     Nutrition:   Eat at least 5 servings of fruits and vegetables each day.  Eat whole-grain bread, whole-wheat pasta and brown rice instead of white grains and rice.  Get adequate Calcium and Vitamin D.      Lifestyle  Exercise at least 150 minutes a week (an average of 30 minutes a day, 5 days a week). This will help you control your weight and prevent disease.  Limit alcohol to one drink per day.  No smoking.   Wear sunscreen to prevent skin cancer.  See your dentist every six months for an exam and cleaning.

## 2023-11-15 NOTE — PROGRESS NOTES
"Ophelia Vincent is a 43 year old female here for a general check up.  She is fasting. She is up to date on eye exams and dental visits.    HCM  Advanced directive: given today  COVID Series August 2023, declines COVID vaccine  Other vaccines declines Flu vaccine, others up to date  Colon cancer screening  due, colonoscopy ordered by ob/gyn  Pap completed 2022, follows with [facility]  Mammogram through ob/gyn clinic recent diagnostic mammogram /US benign cysts  Pain and palpable masses flare right before menstrual cycle  Taking evening primrose oil, helping    Screening cardiovascular disease  Seen by preventive cardiology last year.   Coronary calcium screening ordered by cardiology --she wishes to pursue,   +early heart disease in her father and paternal relatives  No current statin use    Outside cholesterol numbers : 10/2023--   , HDL 54 T 107, ratio 4.6  Recent Labs   Lab Test 12/23/22  0816 11/03/22  1350 10/28/21  1344 09/26/19  0838 09/28/18  0908 05/15/18  0840   CHOL 231* 238*   < > 208.0*   < > 213.0*   HDL 62 63   < > 58.0   < > 59.0   * 161*   < > 135.0*   < > 140.0*   TRIG 45 71   < > 70.0   < > 69.0   CHOLHDLRATIO  --   --   --  3.6  --  3.6    < > = values in this interval not displayed.       Diet: Gluten free intermittent fasting keto diet, \"fast like a girl\", down 7 lbs  Eliminate carbs, alcohol, sugar, no red meat    Wt Readings from Last 4 Encounters:   11/16/23 57.6 kg (127 lb)   12/23/22 61.2 kg (134 lb 14.4 oz)   11/03/22 60.3 kg (133 lb)   10/28/21 59 kg (130 lb 0.6 oz)     Body mass index is 21.46 kg/m .    Breast cysts -July 2023  Taking Evening primrose capsules with improvement  Started new diet, keto and intermittent fasting  All benign findings on recent diagnostic mammogram    Iron deficiency  Ferritin 20 at ob/gyn check  +hx of hair thinning, iron did not seem to help, taking Nutrafol now, Rogaine q dwight  Both parents with thinning hair  +hx of heavy " "menses  Taking Slo Fe daily without much change in ferritin    Pain under ribs  Substernal pain , comes and goes,   ?related to posture  Burning quality,  No heartburn    Mole check  Would like skin check today  Has had removal of mole, benign  No skin cancers    PMH, PSH, FH, medications, allergies and immunizations are updated this visit.      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 5 days of bleeding with the first 2 being heavy, some passage of clots.   Taking iron supplements, Slo iron,   Tried MCT oil, cholesterol increased so stopped  Hx abnormal pap? None.  dysmenorrhea/PMS: yes, 1 wk prior to menses, migraine, uses Excederin  Vasomotor sx: no  Contraception: none, open to pregnancy ---recommend prenatal vitamin  G 1 P 1 A 0  Self Breast exam: yes    Current Outpatient Medications   Medication Sig Dispense Refill    Cholecalciferol (VITAMIN D3) 950364 UNIT/GM POWD Take 1 tablet by mouth      Ferrous Gluconate 240 (27 Fe) MG TABS Liquid 10mg per 10ml 1 tablet 1    vitamin E (TOCOPHEROL) 400 units (180 mg) capsule Take by mouth daily      zinc sulfate (ZINCATE) 220 (50 Zn) MG capsule Take 220 mg by mouth daily       Allergies   Allergen Reactions    Penicillins          ROS  CONSTITUTIONAL:NEGATIVE for fever, chills, +concerted 7# loss  INTEGUMENTARY/SKIN: NEGATIVE for worrisome rashes, moles or lesions + hair thinning, many moles  EYES: NEGATIVE for vision changes or irritation  ENT/MOUTH: NEGATIVE for ear, mouth and throat problems  RESP:NEGATIVE for significant cough or SOB  Breast\" +tenderness, need for diagnostic mammogram and US with findings of cysts  CV: NEGATIVE for chest pain, palpitations, RICKETTS, orthopnea, PND  or peripheral edema  GI: NEGATIVE for nausea, abdominal pain, " "heartburn, or change in bowel habits, some \"burning sensation just below ribs, below sternum, no trigger\" denies GERD  :NEGATIVE for frequency, dysuria, or hematuria  MUSCULOSKELETAL:NEGATIVE for significant arthralgias or myalgia  NEURO: NEGATIVE for weakness, dizziness or paresthesias Migraines rare  ENDOCRINE: NEGATIVE for polyuria/dipsia,  temperature intolerance, skin/hair changes  HEME/ALLERGY/IMMUNE: NEGATIVE for bleeding problems  PSYCHIATRIC: +gusman prior to menses    EXAM  /80 (BP Location: Left arm, Patient Position: Sitting, Cuff Size: Adult Regular)   Pulse 75   Temp 98.4  F (36.9  C) (Temporal)   Resp 16   Ht 1.638 m (5' 4.5\")   Wt 57.6 kg (127 lb)   SpO2 98%   BMI 21.46 kg/m    GENERAL APPEARANCE: Alert, pleasant, NAD  EYES: PERRL, EOMI, conjunctiva clear  HENT: TM normal bilaterally. Nose and mouth without lesions  NECK: no adenopathy, thyroid normal to palpation  RESP: lungs clear to auscultation bilaterally,  BREAST: normal without masses, no tenderness or nipple discharge and no palpable  axillary masses or adenopathy  CV: regular rate and rhythm, normal S1 S2, no murmur, no carotid bruits  ABDOMEN: soft, nontender, without HSM or masses. Bowel sounds normal  MS: extremities normal- no gross deformities noted, no tender, hot or swollen joints.    SKIN:  skin check today without suspicious findings  NEURO: Normal strength and tone, sensory exam grossly normal, DTR normoreflexive in upper and lower extremities  PSYCH: mentation appears normal. and affect normal/bright.  EXT: no peripheral edema, pedal pulses palpable    Assessment:  (Z00.00) Routine general medical examination at a health care facility  (primary encounter diagnosis)  Comment: 43 year old woman in good health  Plan: Today we discussed ways to maintain a healthy lifestyle with sensible eating, regular exercise and self cares. We dicussed calcium and Vitamin D intake, vaccinations and preventive health screens.    She is " referred to mammography and colonoscopy via ob/gyn. Declines COVID/FLU vaccines    (Z13.220) Lipid screening  Comment: fasting  Plan: Lipid Profile  Recommended she pursue CT coronary calcium scan, number given for scheduling. No current statin use.     (N92.0) Menorrhagia with regular cycle  Comment: heavy menses, low ferritin, hair thinning, she takes SloFe  Plan: CBC with platelets, Ferritin        Discussed changing dosing to 3-4 days a week and take with VitC 100mg daily    Mildred Becerra MD  Internal Medicine/Pediatrics      Answers submitted by the patient for this visit:  Annual Preventive Visit (Submitted on 11/15/2023)  Chief Complaint: Annual Exam:  Frequency of exercise:: 1 day/week  Getting at least 3 servings of Calcium per day:: Yes  Diet:: Gluten-free/reduced  Taking medications regularly:: Not Applicable  Medication side effects:: Not applicable  Bi-annual eye exam:: Yes  Dental care twice a year:: Yes  Sleep apnea or symptoms of sleep apnea:: None  abdominal pain: No  Blood in stool: No  Blood in urine: No  chest pain: No  chills: No  congestion: No  constipation: No  cough: No  diarrhea: No  dizziness: No  ear pain: No  eye pain: No  nervous/anxious: No  fever: No  frequency: No  genital sores: No  headaches: No  hearing loss: No  heartburn: No  arthralgias: No  joint swelling: No  peripheral edema: No  mood changes: No  myalgias: No  nausea: No  dysuria: No  palpitations: No  Skin sensation changes: No  sore throat: No  urgency: No  rash: No  shortness of breath: No  visual disturbance: No  weakness: No  pelvic pain: No  vaginal bleeding: No  vaginal discharge: No  tenderness: Yes  breast mass: No  breast discharge: No  Additional concerns today:: No  Exercise outside of work (Submitted on 11/15/2023)  Chief Complaint: Annual Exam:  Duration of exercise:: 30-45 minutes

## 2023-11-16 ENCOUNTER — OFFICE VISIT (OUTPATIENT)
Dept: FAMILY MEDICINE | Facility: CLINIC | Age: 43
End: 2023-11-16
Payer: COMMERCIAL

## 2023-11-16 VITALS
HEART RATE: 75 BPM | OXYGEN SATURATION: 98 % | HEIGHT: 65 IN | WEIGHT: 127 LBS | SYSTOLIC BLOOD PRESSURE: 119 MMHG | BODY MASS INDEX: 21.16 KG/M2 | DIASTOLIC BLOOD PRESSURE: 80 MMHG | RESPIRATION RATE: 16 BRPM | TEMPERATURE: 98.4 F

## 2023-11-16 DIAGNOSIS — Z13.220 LIPID SCREENING: ICD-10-CM

## 2023-11-16 DIAGNOSIS — N92.0 MENORRHAGIA WITH REGULAR CYCLE: ICD-10-CM

## 2023-11-16 DIAGNOSIS — Z00.00 ROUTINE GENERAL MEDICAL EXAMINATION AT A HEALTH CARE FACILITY: Primary | ICD-10-CM

## 2023-11-16 LAB
ERYTHROCYTE [DISTWIDTH] IN BLOOD BY AUTOMATED COUNT: 11.9 % (ref 10–15)
HCT VFR BLD AUTO: 39.8 % (ref 35–47)
HGB BLD-MCNC: 13.3 G/DL (ref 11.7–15.7)
MCH RBC QN AUTO: 30.9 PG (ref 26.5–33)
MCHC RBC AUTO-ENTMCNC: 33.4 G/DL (ref 31.5–36.5)
MCV RBC AUTO: 92 FL (ref 78–100)
PLATELET # BLD AUTO: 289 10E3/UL (ref 150–450)
RBC # BLD AUTO: 4.31 10E6/UL (ref 3.8–5.2)
WBC # BLD AUTO: 6 10E3/UL (ref 4–11)

## 2023-11-16 PROCEDURE — 80061 LIPID PANEL: CPT | Performed by: INTERNAL MEDICINE

## 2023-11-16 PROCEDURE — 82728 ASSAY OF FERRITIN: CPT | Performed by: INTERNAL MEDICINE

## 2023-11-16 NOTE — NURSING NOTE
"43 year old  Chief Complaint   Patient presents with    Physical       Blood pressure 119/80, pulse 75, temperature 98.4  F (36.9  C), temperature source Temporal, resp. rate 16, height 1.638 m (5' 4.5\"), weight 57.6 kg (127 lb), SpO2 98%, not currently breastfeeding. Body mass index is 21.46 kg/m .  Patient Active Problem List   Diagnosis    Migraine    Unicornate uterus    Migraine without aura and without status migrainosus, not intractable    Anxiety state    Constipation    Contact dermatitis and other eczema, due to unspecified cause    Endometriosis    Family history of ischemic heart disease    High-risk pregnancy, elderly primigravida    Hyperlipidemia     labor     premature rupture of membranes (PPROM) delivered, current hospitalization    Unicornuate uterus affecting pregnancy, antepartum    Varicose veins of legs    Vitamin D deficiency    Neoplasm of uncertain behavior    Acne vulgaris    Seborrheic keratoses, inflamed       Wt Readings from Last 2 Encounters:   23 57.6 kg (127 lb)   22 61.2 kg (134 lb 14.4 oz)     BP Readings from Last 3 Encounters:   23 119/80   22 117/77   22 121/81         Current Outpatient Medications   Medication    Cholecalciferol (VITAMIN D3) 847968 UNIT/GM POWD    Ferrous Gluconate 240 (27 Fe) MG TABS    vitamin E (TOCOPHEROL) 400 units (180 mg) capsule    zinc sulfate (ZINCATE) 220 (50 Zn) MG capsule     No current facility-administered medications for this visit.       Social History     Tobacco Use    Smoking status: Former     Packs/day: 1.00     Years: 5.00     Additional pack years: 0.00     Total pack years: 5.00     Types: Cigarettes     Quit date: 2006     Years since quittin.2    Smokeless tobacco: Never   Substance Use Topics    Alcohol use: Yes     Comment: 2-3 per week    Drug use: No       Health Maintenance Due   Topic Date Due    ADVANCE CARE PLANNING  Never done    COLORECTAL CANCER SCREENING  Never done " "   MAMMO SCREENING  07/27/2019    INFLUENZA VACCINE (1) 09/01/2023    COVID-19 Vaccine (4 - 2023-24 season) 09/01/2023       No results found for: \"PAP\"      November 16, 2023 12:51 PM    "

## 2023-11-17 LAB
CHOLEST SERPL-MCNC: 252 MG/DL
FERRITIN SERPL-MCNC: 49 NG/ML (ref 6–175)
HDLC SERPL-MCNC: 60 MG/DL
LDLC SERPL CALC-MCNC: 178 MG/DL
NONHDLC SERPL-MCNC: 192 MG/DL
TRIGL SERPL-MCNC: 68 MG/DL

## 2023-11-30 ENCOUNTER — HOSPITAL ENCOUNTER (OUTPATIENT)
Dept: CT IMAGING | Facility: CLINIC | Age: 43
Discharge: HOME | End: 2023-11-30
Attending: NURSE PRACTITIONER | Admitting: NURSE PRACTITIONER
Payer: COMMERCIAL

## 2023-11-30 DIAGNOSIS — E78.5 HYPERLIPIDEMIA, UNSPECIFIED HYPERLIPIDEMIA TYPE: ICD-10-CM

## 2023-11-30 PROCEDURE — 75571 CT HRT W/O DYE W/CA TEST: CPT

## 2023-11-30 PROCEDURE — 75571 CT HRT W/O DYE W/CA TEST: CPT | Mod: 26 | Performed by: STUDENT IN AN ORGANIZED HEALTH CARE EDUCATION/TRAINING PROGRAM

## 2023-12-05 DIAGNOSIS — Z82.49 FAMILY HISTORY OF ISCHEMIC HEART DISEASE: Primary | ICD-10-CM

## 2023-12-05 RX ORDER — ATORVASTATIN CALCIUM 10 MG/1
10 TABLET, FILM COATED ORAL DAILY
Qty: 90 TABLET | Refills: 3 | Status: SHIPPED | OUTPATIENT
Start: 2023-12-05

## 2023-12-10 ENCOUNTER — HEALTH MAINTENANCE LETTER (OUTPATIENT)
Age: 43
End: 2023-12-10

## 2023-12-24 ENCOUNTER — MYC MEDICAL ADVICE (OUTPATIENT)
Dept: FAMILY MEDICINE | Facility: CLINIC | Age: 43
End: 2023-12-24

## 2023-12-24 DIAGNOSIS — Z12.11 COLON CANCER SCREENING: Primary | ICD-10-CM

## 2023-12-27 ENCOUNTER — LAB (OUTPATIENT)
Dept: FAMILY MEDICINE | Facility: CLINIC | Age: 43
End: 2023-12-27

## 2023-12-27 DIAGNOSIS — Z12.11 COLON CANCER SCREENING: ICD-10-CM

## 2024-01-16 LAB — NONINV COLON CA DNA+OCC BLD SCRN STL QL: NEGATIVE

## 2024-11-14 NOTE — PROGRESS NOTES
"Ophelia Vincent is a 44 year old female with hx of migraine, unicornate uterus, anxiety, constipation, hyperlipidemia, varicose veins. .  She is here for a general check up.  She is fasting. She is up to date on eye exams (readers) and dental visits.    HCM  Advanced directive: None on file--discussed  COVID/Influenza vaccines: Declined  Other vaccines:  Up to date  Colon cancer screening: cologuard neg 2024, Next due 1/6/2027  Pap: Next due 9/8/2027, follows with ob/gyn  Mammogram:  ordered by ob/gyn-due next month  DEXA: 62yo     Diet: Gluten free, \"Fast like a girl\" , omnivore, meat 4-5 x per week    Wt Readings from Last 4 Encounters:   11/21/24 58.7 kg (129 lb 6.4 oz)   11/16/23 57.6 kg (127 lb)   12/23/22 61.2 kg (134 lb 14.4 oz)   11/03/22 60.3 kg (133 lb)     Body mass index is 22.15 kg/m .     Umbilical hernia?  No history of abdominal surgery  Notes a hollow area above the umbilicus, for the past year  No bulging, no overlying skin changes    GERD  Pain at midepigastrium, lasts several days, usually correlates to onset of her menses  Comes and goes  No burping, no acid reflux.  Not taking any medication to treat symptoms  No dysphagia    Hyperlipidemia  Had been prescribed atorvastatin 12/2023 by preventive cardiology--she never filled Rx   Father with MI at age 50 and CABG surgery at age 60   Paternal grandfather had a MI at age 50.   Two paternal uncles also has MI at age 50.   Lp(a) was 86 in 2018.   Preventive cardiology ordered Coronary CT, + score 6  She takes bergamot supplement  1200mg once or twice daily for the past year  Artichoke heart tablet daily for the past year  CoQ 10  Magnesium glutamate once daily  Nutrifol for hair thinning  Recent Labs   Lab Test 11/16/23  1353 12/23/22  0816 10/28/21  1344 09/26/19  0838 09/28/18  0908 05/15/18  0840   CHOL 252* 231*   < > 208.0*   < > 213.0*   HDL 60 62   < > 58.0   < > 59.0   * 160*   < > 135.0*   < > 140.0*   TRIG 68 45   < > 70.0 "   < > 69.0   CHOLHDLRATIO  --   --   --  3.6  --  3.6    < > = values in this interval not displayed.     Sleep  Not great  Awakens once at night, easy to get back to sleep easier       PMH, PSH, FH, medications, allergies and immunizations are updated this visit.        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  Exercise: no formal program      OB/GYN HISTORY:   LMP: Patient's last menstrual period was 11/10/2024 (exact date).  Cycle: Regular, 28 days. 4-5 days of bleeding with the first 2 being heavy, some passage of clots.   Taking iron supplements, Slo iron--out for months  Hx abnormal pap? None.  dysmenorrhea/PMS: yes, 1 wk prior to menses, migraine, uses Excederin  Vasomotor sx: no  Contraception: none, open to pregnancy ---recommend prenatal vitamin  G 1 P 1 A 0  Self Breast exam: yes     Current Outpatient Medications   Medication Sig Dispense Refill    Cholecalciferol (VITAMIN D3) 139063 UNIT/GM POWD Take 1 tablet by mouth         Allergies   Allergen Reactions    Penicillins            ROS  CONSTITUTIONAL:NEGATIVE for fever, chills, change in weight  INTEGUMENTARY/SKIN: NEGATIVE for worrisome rashes, moles or lesions  EYES: NEGATIVE for vision changes or irritation  ENT/MOUTH: NEGATIVE for ear, mouth and throat problems  RESP:NEGATIVE for significant cough or SOB  CV: NEGATIVE for chest pain, palpitations, RICKETTS, orthopnea, PND  or peripheral edema  GI: NEGATIVE for nausea, abdominal pain, or change in bowel habits + mid epigastric pain just prior to menses, hollowed area above umbilicus  :NEGATIVE for frequency, dysuria, or hematuria  MUSCULOSKELETAL:NEGATIVE for significant arthralgias or myalgia  NEURO: NEGATIVE for weakness, dizziness or paresthesias + history of migraines  ENDOCRINE: NEGATIVE for polyuria/dipsia,  temperature intolerance,  "skin/hair changes  HEME/ALLERGY/IMMUNE: NEGATIVE for bleeding problems  PSYCHIATRIC: NEGATIVE for changes in mood or affect     EXAM  /85 (BP Location: Left arm, Patient Position: Sitting, Cuff Size: Adult Regular)   Pulse 72   Temp 98.2  F (36.8  C) (Skin)   Resp 15   Ht 1.628 m (5' 4.09\")   Wt 58.7 kg (129 lb 6.4 oz)   LMP 11/10/2024 (Exact Date)   SpO2 99%   BMI 22.15 kg/m         GENERAL APPEARANCE: Alert, pleasant, NAD  EYES: PERRL, EOMI, conjunctiva clear  HENT: TM normal bilaterally. Nose and mouth without lesions  NECK: no adenopathy, thyroid normal to palpation  RESP: lungs clear to auscultation bilaterally,  CV: regular rate and rhythm, normal S1 S2, no murmur, no carotid bruits  ABDOMEN: soft, minimal midepigastric tenderness, hollowed area above umbilicus, no bulging with Valsalva maneuver, without HSM or masses. Bowel sounds normal  MS: extremities normal- no gross deformities noted, no tender, hot or swollen joints.    SKIN: no suspicious lesions or rashes  NEURO: Normal strength and tone, sensory exam grossly normal, DTR normoreflexive in upper and lower extremities  PSYCH: mentation appears normal. and affect normal/bright.  EXT: no peripheral edema, pedal pulses palpable     Assessment:  (Z00.00) Routine general medical examination at a health care facility  (primary encounter diagnosis)  Comment: 44 year old woman in good health  Plan: Today we discussed ways to maintain a healthy lifestyle with sensible eating, regular exercise and self cares. We dicussed calcium and Vitamin D intake, vaccinations and preventive health screens.  She declines influenza and COVID boosters today.  She follows with OB/GYN and is up-to-date on Pap and mammogram      (L65.9) Hair thinning  Comment: No current vitamin supplements, persistent symptoms  Plan: CBC with platelets, Ferritin        Check CBC and ferritin today    (E78.5) Hyperlipidemia LDL goal <100  Comment: Strong family history of early " coronary artery disease.  Preventive cardiology prescribed atorvastatin but she never filled it.  She is taking Bergamet and artichoke supplements  Plan: Lipid Profile        Await lipid profile, consider return to preventive cardiology    Mildred Becerra MD  Internal Medicine/Pediatrics

## 2024-11-14 NOTE — PATIENT INSTRUCTIONS
Patient Education   Preventive Care Advice   This is general advice given by our system to help you stay healthy. However, your care team may have specific advice just for you. Please talk to your care team about your preventive care needs.  Nutrition  Eat 5 or more servings of fruits and vegetables each day.  Try wheat bread, brown rice and whole grain pasta (instead of white bread, rice, and pasta).  Get enough calcium and vitamin D. Check the label on foods and aim for 100% of the RDA (recommended daily allowance).  Lifestyle  Exercise at least 150 minutes each week  (30 minutes a day, 5 days a week).  Do muscle strengthening activities 2 days a week. These help control your weight and prevent disease.  No smoking.  Wear sunscreen to prevent skin cancer.  Have a dental exam and cleaning every 6 months.  Yearly exams  See your health care team every year to talk about:  Any changes in your health.  Any medicines your care team has prescribed.  Preventive care, family planning, and ways to prevent chronic diseases.  Shots (vaccines)   HPV shots (up to age 26), if you've never had them before.  Hepatitis B shots (up to age 59), if you've never had them before.  COVID-19 shot: Get this shot when it's due.  Flu shot: Get a flu shot every year.  Tetanus shot: Get a tetanus shot every 10 years.  Pneumococcal, hepatitis A, and RSV shots: Ask your care team if you need these based on your risk.  Shingles shot (for age 50 and up)  General health tests  Diabetes screening:  Starting at age 35, Get screened for diabetes at least every 3 years.  If you are younger than age 35, ask your care team if you should be screened for diabetes.  Cholesterol test: At age 39, start having a cholesterol test every 5 years, or more often if advised.  Bone density scan (DEXA): At age 50, ask your care team if you should have this scan for osteoporosis (brittle bones).  Hepatitis C: Get tested at least once in your life.  STIs (sexually  transmitted infections)  Before age 24: Ask your care team if you should be screened for STIs.  After age 24: Get screened for STIs if you're at risk. You are at risk for STIs (including HIV) if:  You are sexually active with more than one person.  You don't use condoms every time.  You or a partner was diagnosed with a sexually transmitted infection.  If you are at risk for HIV, ask about PrEP medicine to prevent HIV.  Get tested for HIV at least once in your life, whether you are at risk for HIV or not.  Cancer screening tests  Cervical cancer screening: If you have a cervix, begin getting regular cervical cancer screening tests starting at age 21.  Breast cancer scan (mammogram): If you've ever had breasts, begin having regular mammograms starting at age 40. This is a scan to check for breast cancer.  Colon cancer screening: It is important to start screening for colon cancer at age 45.  Have a colonoscopy test every 10 years (or more often if you're at risk) Or, ask your provider about stool tests like a FIT test every year or Cologuard test every 3 years.  To learn more about your testing options, visit:   .  For help making a decision, visit:   https://bit.ly/wq88303.  Prostate cancer screening test: If you have a prostate, ask your care team if a prostate cancer screening test (PSA) at age 55 is right for you.  Lung cancer screening: If you are a current or former smoker ages 50 to 80, ask your care team if ongoing lung cancer screenings are right for you.  For informational purposes only. Not to replace the advice of your health care provider. Copyright   2023 [email or web address]. All rights reserved. Clinically reviewed by the [facility] Transitions Program. [employer] [id number] - REV 01/24.

## 2024-11-17 SDOH — HEALTH STABILITY: PHYSICAL HEALTH: ON AVERAGE, HOW MANY MINUTES DO YOU ENGAGE IN EXERCISE AT THIS LEVEL?: 40 MIN

## 2024-11-17 SDOH — HEALTH STABILITY: PHYSICAL HEALTH: ON AVERAGE, HOW MANY DAYS PER WEEK DO YOU ENGAGE IN MODERATE TO STRENUOUS EXERCISE (LIKE A BRISK WALK)?: 1 DAY

## 2024-11-17 ASSESSMENT — SOCIAL DETERMINANTS OF HEALTH (SDOH): HOW OFTEN DO YOU GET TOGETHER WITH FRIENDS OR RELATIVES?: TWICE A WEEK

## 2024-11-21 ENCOUNTER — OFFICE VISIT (OUTPATIENT)
Dept: FAMILY MEDICINE | Facility: CLINIC | Age: 44
End: 2024-11-21
Payer: COMMERCIAL

## 2024-11-21 VITALS
BODY MASS INDEX: 22.09 KG/M2 | RESPIRATION RATE: 15 BRPM | SYSTOLIC BLOOD PRESSURE: 130 MMHG | WEIGHT: 129.4 LBS | HEART RATE: 72 BPM | HEIGHT: 64 IN | DIASTOLIC BLOOD PRESSURE: 85 MMHG | TEMPERATURE: 98.2 F | OXYGEN SATURATION: 99 %

## 2024-11-21 DIAGNOSIS — L65.9 HAIR THINNING: ICD-10-CM

## 2024-11-21 DIAGNOSIS — Z00.00 ROUTINE GENERAL MEDICAL EXAMINATION AT A HEALTH CARE FACILITY: Primary | ICD-10-CM

## 2024-11-21 DIAGNOSIS — E78.5 HYPERLIPIDEMIA LDL GOAL <100: ICD-10-CM

## 2024-11-21 LAB
ERYTHROCYTE [DISTWIDTH] IN BLOOD BY AUTOMATED COUNT: 11.5 % (ref 10–15)
HCT VFR BLD AUTO: 40.6 % (ref 35–47)
HGB BLD-MCNC: 13.7 G/DL (ref 11.7–15.7)
MCH RBC QN AUTO: 30.3 PG (ref 26.5–33)
MCHC RBC AUTO-ENTMCNC: 33.7 G/DL (ref 31.5–36.5)
MCV RBC AUTO: 90 FL (ref 78–100)
PLATELET # BLD AUTO: 282 10E3/UL (ref 150–450)
RBC # BLD AUTO: 4.52 10E6/UL (ref 3.8–5.2)
WBC # BLD AUTO: 8.2 10E3/UL (ref 4–11)

## 2024-11-21 ASSESSMENT — PAIN SCALES - GENERAL: PAINLEVEL_OUTOF10: NO PAIN (0)

## 2024-11-21 NOTE — NURSING NOTE
"44 year old    Chief Complaint   Patient presents with    Physical        Blood pressure 130/85, pulse 72, temperature 98.2  F (36.8  C), temperature source Skin, resp. rate 15, height 1.628 m (5' 4.09\"), weight 58.7 kg (129 lb 6.4 oz), last menstrual period 11/10/2024, SpO2 99%, not currently breastfeeding. Body mass index is 22.15 kg/m .    Patient Active Problem List   Diagnosis    Migraine    Unicornate uterus    Migraine without aura and without status migrainosus, not intractable    Anxiety state    Constipation    Contact dermatitis and other eczema, due to unspecified cause    Endometriosis    Family history of ischemic heart disease    High-risk pregnancy, elderly primigravida    Hyperlipidemia     labor     premature rupture of membranes (PPROM) delivered, current hospitalization    Unicornuate uterus affecting pregnancy, antepartum    Varicose veins of legs    Vitamin D deficiency    Neoplasm of uncertain behavior    Acne vulgaris    Seborrheic keratoses, inflamed          Wt Readings from Last 2 Encounters:   24 58.7 kg (129 lb 6.4 oz)   23 57.6 kg (127 lb)       BP Readings from Last 3 Encounters:   24 130/85   23 119/80   22 117/77             Current Outpatient Medications   Medication Sig Dispense Refill    Cholecalciferol (VITAMIN D3) 374136 UNIT/GM POWD Take 1 tablet by mouth      Ferrous Gluconate 240 (27 Fe) MG TABS Liquid 10mg per 10ml 1 tablet 1    atorvastatin (LIPITOR) 10 MG tablet Take 1 tablet (10 mg) by mouth daily 90 tablet 3     No current facility-administered medications for this visit.          Social History     Tobacco Use    Smoking status: Former     Current packs/day: 0.00     Average packs/day: 1 pack/day for 5.0 years (5.0 ttl pk-yrs)     Types: Cigarettes     Start date: 2001     Quit date: 2006     Years since quittin.2    Smokeless tobacco: Never   Vaping Use    Vaping status: Never Used   Substance Use Topics    " "Alcohol use: Yes     Comment: 2-3 per week    Drug use: No          Health Maintenance Due   Topic Date Due    ADVANCE CARE PLANNING  Never done    MAMMO SCREENING  07/27/2020    INFLUENZA VACCINE (1) 09/01/2024    COVID-19 Vaccine (4 - 2024-25 season) 09/01/2024    LIPID  11/16/2024        No results found for: \"PAP\"        November 21, 2024 1:32 PM        "